# Patient Record
Sex: FEMALE | Race: BLACK OR AFRICAN AMERICAN | NOT HISPANIC OR LATINO | Employment: FULL TIME | URBAN - METROPOLITAN AREA
[De-identification: names, ages, dates, MRNs, and addresses within clinical notes are randomized per-mention and may not be internally consistent; named-entity substitution may affect disease eponyms.]

---

## 2021-12-03 ENCOUNTER — OFFICE VISIT (OUTPATIENT)
Dept: FAMILY MEDICINE CLINIC | Facility: CLINIC | Age: 36
End: 2021-12-03

## 2021-12-03 ENCOUNTER — OFFICE VISIT (OUTPATIENT)
Dept: OBSTETRICS AND GYNECOLOGY | Facility: CLINIC | Age: 36
End: 2021-12-03

## 2021-12-03 ENCOUNTER — PATIENT ROUNDING (BHMG ONLY) (OUTPATIENT)
Dept: FAMILY MEDICINE CLINIC | Facility: CLINIC | Age: 36
End: 2021-12-03

## 2021-12-03 VITALS
DIASTOLIC BLOOD PRESSURE: 102 MMHG | OXYGEN SATURATION: 99 % | WEIGHT: 293 LBS | HEART RATE: 80 BPM | BODY MASS INDEX: 45.99 KG/M2 | SYSTOLIC BLOOD PRESSURE: 146 MMHG | HEIGHT: 67 IN | TEMPERATURE: 98.9 F

## 2021-12-03 VITALS
HEIGHT: 67 IN | DIASTOLIC BLOOD PRESSURE: 83 MMHG | SYSTOLIC BLOOD PRESSURE: 158 MMHG | WEIGHT: 293 LBS | HEART RATE: 76 BPM | BODY MASS INDEX: 45.99 KG/M2

## 2021-12-03 DIAGNOSIS — E55.9 VITAMIN D DEFICIENCY: ICD-10-CM

## 2021-12-03 DIAGNOSIS — R10.2 PELVIC PAIN IN FEMALE: ICD-10-CM

## 2021-12-03 DIAGNOSIS — E78.2 MIXED HYPERLIPIDEMIA: ICD-10-CM

## 2021-12-03 DIAGNOSIS — N92.6 MISSED MENSES: Primary | ICD-10-CM

## 2021-12-03 DIAGNOSIS — L70.9 ACNE, UNSPECIFIED ACNE TYPE: ICD-10-CM

## 2021-12-03 DIAGNOSIS — J45.20 MILD INTERMITTENT ASTHMA WITHOUT COMPLICATION: ICD-10-CM

## 2021-12-03 DIAGNOSIS — I10 PRIMARY HYPERTENSION: Primary | ICD-10-CM

## 2021-12-03 DIAGNOSIS — L70.0 ACNE VULGARIS: ICD-10-CM

## 2021-12-03 DIAGNOSIS — R63.5 WEIGHT GAIN: ICD-10-CM

## 2021-12-03 LAB
B-HCG UR QL: NEGATIVE
EXPIRATION DATE: NORMAL
INTERNAL NEGATIVE CONTROL: NEGATIVE
INTERNAL POSITIVE CONTROL: POSITIVE
Lab: NORMAL

## 2021-12-03 PROCEDURE — 81025 URINE PREGNANCY TEST: CPT | Performed by: OBSTETRICS & GYNECOLOGY

## 2021-12-03 PROCEDURE — 99204 OFFICE O/P NEW MOD 45 MIN: CPT | Performed by: OBSTETRICS & GYNECOLOGY

## 2021-12-03 PROCEDURE — 99204 OFFICE O/P NEW MOD 45 MIN: CPT | Performed by: NURSE PRACTITIONER

## 2021-12-03 RX ORDER — LOSARTAN POTASSIUM 50 MG/1
50 TABLET ORAL DAILY
COMMUNITY
Start: 2021-11-06 | End: 2022-06-24 | Stop reason: SDUPTHER

## 2021-12-03 RX ORDER — ALBUTEROL SULFATE 90 UG/1
2 AEROSOL, METERED RESPIRATORY (INHALATION) EVERY 4 HOURS PRN
COMMUNITY
End: 2022-07-06 | Stop reason: SDUPTHER

## 2021-12-03 RX ORDER — DOXYCYCLINE HYCLATE 100 MG
100 TABLET ORAL AS NEEDED
COMMUNITY
Start: 2021-11-12 | End: 2022-11-18

## 2021-12-03 RX ORDER — ATORVASTATIN CALCIUM 40 MG/1
40 TABLET, FILM COATED ORAL DAILY
COMMUNITY
End: 2022-11-03 | Stop reason: SDUPTHER

## 2021-12-03 RX ORDER — MINOCYCLINE 40 MG/G
AEROSOL, FOAM TOPICAL
COMMUNITY
Start: 2021-11-29 | End: 2022-11-18

## 2021-12-03 NOTE — PROGRESS NOTES
"Chief Complaint  Establish Care and Hypertension    Subjective          Leonarda Hogan presents to [facility]  History of Present Illness  She presents today as a new patient to establish care.  [place]    Hypertension: Blood pressure is noted to be elevated today at 146/102.  Patient states she has not been good about taking her medications lately.  She was previously on amlodipine 10 mg and hydrochlorothiazide 25 mg daily but was switched to losartan 50 mg daily.    Hyperlipidemia: She is currently on atorvastatin 40 mg every evening.  She has not had a lab work checked for over 6 months.    She has a history of chronic acne and takes doxycycline and uses Amzeeq topical.    She does have family history of diabetes and heart disease.  She states she has been tested for diabetes in the past and has been negative.    Her sister was diagnosed with breast cancer at age 39.  Patient has never had a mammogram.  She states that she discussed this with her previous provider and does not want to get a mammogram because she does not want to get radiation exposure at this time.    She states she has a history of vitamin D deficiency and would like to have her vitamin D level checked with her labs.    She states she has a history of asthma and only uses her albuterol inhaler occasionally.  Objective   Vital Signs:   BP (!) 146/102   Pulse 80   Temp 98.9 °F (37.2 °C)   Ht 170.2 cm (67\")   Wt (!) 153 kg (336 lb 6.4 oz)   SpO2 99%   BMI 52.69 kg/m²     Physical Exam  Vitals reviewed.   Constitutional:       Appearance: Normal appearance. She is well-developed.   Neck:      Thyroid: No thyroid mass, thyromegaly or thyroid tenderness.   Cardiovascular:      Rate and Rhythm: Normal rate and regular rhythm.      Heart sounds: No murmur heard.  No friction rub. No gallop.    Pulmonary:      Effort: Pulmonary effort is normal.      Breath sounds: Normal breath " sounds. No wheezing or rhonchi.   Lymphadenopathy:      Cervical: No cervical adenopathy.   Skin:     General: Skin is warm and dry.   Neurological:      Mental Status: She is alert and oriented to person, place, and time.      Cranial Nerves: No cranial nerve deficit.   Psychiatric:         Mood and Affect: Mood and affect normal.         Behavior: Behavior normal.         Thought Content: Thought content normal. Thought content does not include homicidal or suicidal ideation.         Judgment: Judgment normal.        Result Review :                 Assessment and Plan    Diagnoses and all orders for this visit:    1. Primary hypertension (Primary)  Assessment & Plan:  Hypertension is Uncontrolled due to patient not taking medication every day.  Continue current medications.  Ambulatory blood pressure monitoring.  Advised to take medication every day and to monitor blood pressure at home.  Patient to notify if blood pressure does not improve.  Blood pressure will be reassessed at the next regular appointment.    Orders:  -     CBC Auto Differential  -     Comprehensive Metabolic Panel  -     Lipid Panel  -     TSH Rfx On Abnormal To Free T4    2. Mixed hyperlipidemia  Assessment & Plan:  Lipid abnormalities are newly identified.  Pharmacotherapy as ordered.  Lipids will be reassessed Today with labs.    Orders:  -     Comprehensive Metabolic Panel  -     Lipid Panel  -     TSH Rfx On Abnormal To Free T4    3. Mild intermittent asthma without complication  Assessment & Plan:  Asthma is unchanged.  The patient is experiencing no daytime asthma symptoms. She is experiencing no nighttime asthma symptoms.  Discussed monitoring symptoms and use of quick-relief medications and contacting us early in the course of exacerbations.          4. Acne vulgaris  Assessment & Plan:  Stable on meds as listed.      5. Vitamin D deficiency  -     Vitamin D 25 Hydroxy      Follow Up   Return in about 6 months (around 6/3/2022) for  Next scheduled follow up.  Patient was given instructions and counseling regarding her condition or for health maintenance advice. Please see specific information pulled into the AVS if appropriate.   As staff was getting patient to get her labs, she mentioned that she forgot to discuss her stomach pain and gas issues.  Medical staff explained that they will get her labs and put her back in the room for me to come back in and talk to her but stated that it might be a few minutes.  Patient got upset stating that the provider should come right back and to see her.  She decided to leave without getting her labs and stated she was going to find another provider.

## 2021-12-03 NOTE — ASSESSMENT & PLAN NOTE
Hypertension is Uncontrolled due to patient not taking medication every day.  Continue current medications.  Ambulatory blood pressure monitoring.  Advised to take medication every day and to monitor blood pressure at home.  Patient to notify if blood pressure does not improve.  Blood pressure will be reassessed at the next regular appointment.

## 2021-12-03 NOTE — PROGRESS NOTES
"GYN Problem/Follow Up Visit    Chief Complaint   Patient presents with   • Menstrual Problem   • Pelvic Pain           HPI  Leonarda Hogan is a 36 y.o. female, No obstetric history on file., who presents for no menses since october. States typically has q mon menses but skipped November. Has recently moved and didn't know if the stress caused her to skip her period. Also c/o weight gain and acne. Sees derm. Also c/o pelvic pain and bloating/gassy feeling.        Additional OB/GYN History   Patient's last menstrual period was 10/11/2021 (exact date).  Current contraception: contraceptive methods: None  Desires to: do not start contraception  Allergies : Patient has no known allergies.     The additional following portions of the patient's history were reviewed and updated as appropriate: allergies, current medications, past family history, past medical history, past social history, past surgical history and problem list.    Review of Systems    I have reviewed and agree with the HPI, ROS, and historical information as entered above. Elizabeth Lara, APRN    Objective   /83   Pulse 76   Ht 170.2 cm (67\")   Wt (!) 152 kg (335 lb)   LMP 10/11/2021 (Exact Date)   Breastfeeding No   BMI 52.47 kg/m²     Physical Exam  Vitals reviewed.   Abdominal:      Palpations: Abdomen is soft.      Tenderness: There is no abdominal tenderness.      Comments: No pelvic tenderness noted.   Neurological:      Mental Status: She is alert and oriented to person, place, and time.            Assessment and Plan    Diagnoses and all orders for this visit:    1. Missed menses (Primary)  -     US Pelvis Transvaginal Non OB; Future  -     17-Hydroxyprogesterone; Future  -     DHEA-Sulfate; Future  -     Testosterone, Bioavailable (M); Future  -     Glucose, Fasting; Future  -     Insulin, Total; Future  -     Prolactin; Future  -     T4, Free; Future  -     TSH; Future  -     POC Pregnancy, Urine    2. Weight gain  -     US Pelvis " Transvaginal Non OB; Future  -     17-Hydroxyprogesterone; Future  -     DHEA-Sulfate; Future  -     Testosterone, Bioavailable (M); Future  -     Glucose, Fasting; Future  -     Insulin, Total; Future  -     Prolactin; Future  -     T4, Free; Future  -     TSH; Future    3. Acne, unspecified acne type  -     US Pelvis Transvaginal Non OB; Future  -     17-Hydroxyprogesterone; Future  -     DHEA-Sulfate; Future  -     Testosterone, Bioavailable (M); Future  -     Glucose, Fasting; Future  -     Insulin, Total; Future  -     Prolactin; Future  -     T4, Free; Future  -     TSH; Future    4. Pelvic pain in female  -     US Pelvis Transvaginal Non OB; Future    will check fasting pcos panel and pelvic u/s. Discussed provera. Will do round of provera if no menses in December. Also needs wwe.    Counseling:  She understands the importance of having the above orders performed in a timely fashion.  She is encouraged to review her results online and/or contact or office if she has questions.     Follow Up:  Return for lab and u/s f/u.      Elizabeth Lara, PAM  12/03/2021

## 2021-12-03 NOTE — ASSESSMENT & PLAN NOTE
Lipid abnormalities are newly identified.  Pharmacotherapy as ordered.  Lipids will be reassessed Today with labs.

## 2021-12-03 NOTE — PROGRESS NOTES
December 3, 2021    Hello, may I speak with Leonarda Hogan?    My name is Ishmael Estevez     I am  with [facility]  [address]  [place] [address]  [phone].    Before we get started may I verify your date of birth? 1985    I am calling to officially welcome you to our practice and ask about your recent visit. Is this a good time to talk? yes    Tell me about your visit with us. What things went well?  Pt was unhappy with all of the visit. Pt stated that she was not called back at a timely manner and that once she had gotten screen she should have been seen by the provider. Also, provider had not addressed all of her concern or address why she came to the visit. In addition, she did not like that cyndy was trying to encourage her to have a mammogram she felt she did not need due to her age. Pt left with out drawing labs was displease because she had not been fasting and provider wanted to take the labs.        We're always looking for ways to make our patients' experiences even better. Do you have recommendations on ways we may improve?  no    Overall were you satisfied with your first visit to our practice? no       I appreciate you taking the time to speak with me today. Is there anything else I can do for you? Yes,     practice manage need to find out if she can change providers       Thank you, and have a great day.

## 2021-12-06 ENCOUNTER — PATIENT ROUNDING (BHMG ONLY) (OUTPATIENT)
Dept: FAMILY MEDICINE CLINIC | Facility: CLINIC | Age: 36
End: 2021-12-06

## 2021-12-09 ENCOUNTER — TELEPHONE (OUTPATIENT)
Dept: OBSTETRICS AND GYNECOLOGY | Facility: CLINIC | Age: 36
End: 2021-12-09

## 2021-12-09 NOTE — TELEPHONE ENCOUNTER
TRIED TO CALL PT TO LET HER KNOW SHE HAS AN APPT ON 1/6/21 WITH SAMMIE JEAN AT [facility].   WAS FULL AND NOT ABLE TO LEAVE A .  IF SHE CALLS PLEASE LET HER KNOW ABOUT HER THE F/U APPT WITH HER  AT [place]

## 2021-12-17 ENCOUNTER — LAB (OUTPATIENT)
Dept: OBSTETRICS AND GYNECOLOGY | Facility: CLINIC | Age: 36
End: 2021-12-17

## 2021-12-17 ENCOUNTER — OFFICE VISIT (OUTPATIENT)
Dept: INTERNAL MEDICINE | Facility: CLINIC | Age: 36
End: 2021-12-17

## 2021-12-17 VITALS
DIASTOLIC BLOOD PRESSURE: 86 MMHG | WEIGHT: 293 LBS | OXYGEN SATURATION: 100 % | TEMPERATURE: 99.2 F | SYSTOLIC BLOOD PRESSURE: 126 MMHG | BODY MASS INDEX: 45.99 KG/M2 | HEIGHT: 67 IN | HEART RATE: 78 BPM

## 2021-12-17 DIAGNOSIS — I10 ESSENTIAL HYPERTENSION: ICD-10-CM

## 2021-12-17 DIAGNOSIS — E55.9 VITAMIN D DEFICIENCY: Primary | ICD-10-CM

## 2021-12-17 DIAGNOSIS — Z80.3 FAMILY HISTORY OF BREAST CANCER IN SISTER: ICD-10-CM

## 2021-12-17 DIAGNOSIS — N92.6 MISSED MENSES: ICD-10-CM

## 2021-12-17 DIAGNOSIS — E78.5 HYPERLIPIDEMIA, UNSPECIFIED HYPERLIPIDEMIA TYPE: ICD-10-CM

## 2021-12-17 DIAGNOSIS — L70.9 ACNE, UNSPECIFIED ACNE TYPE: ICD-10-CM

## 2021-12-17 DIAGNOSIS — R63.5 WEIGHT GAIN: ICD-10-CM

## 2021-12-17 LAB
25(OH)D3 SERPL-MCNC: 26.8 NG/ML (ref 30–100)
ALBUMIN SERPL-MCNC: 4.5 G/DL (ref 3.5–5.2)
ALBUMIN/GLOB SERPL: 1.7 G/DL
ALP SERPL-CCNC: 71 U/L (ref 39–117)
ALT SERPL W P-5'-P-CCNC: 17 U/L (ref 1–33)
ANION GAP SERPL CALCULATED.3IONS-SCNC: 11.5 MMOL/L (ref 5–15)
AST SERPL-CCNC: 19 U/L (ref 1–32)
BILIRUB SERPL-MCNC: 1.1 MG/DL (ref 0–1.2)
BUN SERPL-MCNC: 9 MG/DL (ref 6–20)
BUN/CREAT SERPL: 10.6 (ref 7–25)
CALCIUM SPEC-SCNC: 9.8 MG/DL (ref 8.6–10.5)
CHLORIDE SERPL-SCNC: 103 MMOL/L (ref 98–107)
CHOLEST SERPL-MCNC: 147 MG/DL (ref 0–200)
CO2 SERPL-SCNC: 26.5 MMOL/L (ref 22–29)
CREAT SERPL-MCNC: 0.85 MG/DL (ref 0.57–1)
GFR SERPL CREATININE-BSD FRML MDRD: 92 ML/MIN/1.73
GLOBULIN UR ELPH-MCNC: 2.6 GM/DL
GLUCOSE P FAST SERPL-MCNC: 89 MG/DL (ref 74–106)
GLUCOSE SERPL-MCNC: 89 MG/DL (ref 65–99)
HDLC SERPL-MCNC: 66 MG/DL (ref 40–60)
LDLC SERPL CALC-MCNC: 68 MG/DL (ref 0–100)
LDLC/HDLC SERPL: 1.04 {RATIO}
POTASSIUM SERPL-SCNC: 4.7 MMOL/L (ref 3.5–5.2)
PROLACTIN SERPL-MCNC: 15.8 NG/ML (ref 4.79–23.3)
PROT SERPL-MCNC: 7.1 G/DL (ref 6–8.5)
SODIUM SERPL-SCNC: 141 MMOL/L (ref 136–145)
T4 FREE SERPL-MCNC: 1.35 NG/DL (ref 0.93–1.7)
TRIGL SERPL-MCNC: 61 MG/DL (ref 0–150)
TSH SERPL DL<=0.05 MIU/L-ACNC: 0.86 UIU/ML (ref 0.27–4.2)
VLDLC SERPL-MCNC: 13 MG/DL (ref 5–40)

## 2021-12-17 PROCEDURE — 82947 ASSAY GLUCOSE BLOOD QUANT: CPT | Performed by: OBSTETRICS & GYNECOLOGY

## 2021-12-17 PROCEDURE — 82627 DEHYDROEPIANDROSTERONE: CPT | Performed by: OBSTETRICS & GYNECOLOGY

## 2021-12-17 PROCEDURE — 82306 VITAMIN D 25 HYDROXY: CPT | Performed by: OBSTETRICS & GYNECOLOGY

## 2021-12-17 PROCEDURE — 80053 COMPREHEN METABOLIC PANEL: CPT | Performed by: OBSTETRICS & GYNECOLOGY

## 2021-12-17 PROCEDURE — 83498 ASY HYDROXYPROGESTERONE 17-D: CPT | Performed by: OBSTETRICS & GYNECOLOGY

## 2021-12-17 PROCEDURE — 84439 ASSAY OF FREE THYROXINE: CPT | Performed by: OBSTETRICS & GYNECOLOGY

## 2021-12-17 PROCEDURE — 84443 ASSAY THYROID STIM HORMONE: CPT | Performed by: OBSTETRICS & GYNECOLOGY

## 2021-12-17 PROCEDURE — 83525 ASSAY OF INSULIN: CPT | Performed by: OBSTETRICS & GYNECOLOGY

## 2021-12-17 PROCEDURE — 99214 OFFICE O/P EST MOD 30 MIN: CPT | Performed by: NURSE PRACTITIONER

## 2021-12-17 PROCEDURE — 84410 TESTOSTERONE BIOAVAILABLE: CPT | Performed by: OBSTETRICS & GYNECOLOGY

## 2021-12-17 PROCEDURE — 80061 LIPID PANEL: CPT | Performed by: OBSTETRICS & GYNECOLOGY

## 2021-12-17 PROCEDURE — 84146 ASSAY OF PROLACTIN: CPT | Performed by: OBSTETRICS & GYNECOLOGY

## 2021-12-17 NOTE — ASSESSMENT & PLAN NOTE
Continue to follow with dermatology as scheduled for management of doxycycline and Amzeeq. Will continue to monitor.

## 2021-12-17 NOTE — ASSESSMENT & PLAN NOTE
Discussed with patient increased risk due to family history and need to proceed with early screening with mammogram. Patient aware of risk, defers at this time but will consider at a later date.

## 2021-12-17 NOTE — PROGRESS NOTES
"Chief Complaint  Establish Care and Hypertension    Subjective          Leonarda Hogan presents to [facility]  Previous PCP: Dr. Ernandez; Patient recently moved from [place] for a job at [employer]  Last labs: Today  LMP: 12/5/2021  PAP: 2019  Mammogram: Family history of breast cancer in sister, diagnosed at age 39  Colonoscopy: Declines family history of colon cancer  Influenza vaccination: Declines  COVID19 vaccination: Up to date   Eye exam: 3/2021  Dental exam: 9/2021  Smoking history: Denies  Specialists: OB/GYN; Dermatology    Annual physical exam-  Patient reports father with a heart attack at age 51. Denies chest pain, blurry vision, headache, leg swelling, shortness of breath, seizure activity. Patient has no concerns at this time.    HTN-  Managed with losartan. Denies chest pain, blurry vision, headache, leg swelling.    Vitamin D-  Previously managed with oral supplement, not currently taking.    Acne-  Managed with doxycycline and Amzeeq through Dermatologist in [place].     HLD-  Managed with statin. Well tolerated, denies leg cramping.      Objective   Vital Signs:   /86   Pulse 78   Temp 99.2 °F (37.3 °C)   Ht 170.2 cm (67\")   Wt (!) 149 kg (329 lb)   SpO2 100%   BMI 51.53 kg/m²     Physical Exam  Constitutional:       Appearance: Normal appearance.   HENT:      Head: Normocephalic and atraumatic.      Right Ear: Tympanic membrane normal.      Left Ear: Tympanic membrane normal.      Nose: Nose normal.      Mouth/Throat:      Mouth: Mucous membranes are moist.      Pharynx: Oropharynx is clear.   Eyes:      Extraocular Movements: Extraocular movements intact.      Conjunctiva/sclera: Conjunctivae normal.      Pupils: Pupils are equal, round, and reactive to light.   Neck:      Thyroid: No thyroid mass, thyromegaly or thyroid tenderness.   Cardiovascular:      Rate and Rhythm: Normal rate and regular rhythm.      Heart sounds: Normal " heart sounds.   Pulmonary:      Effort: Pulmonary effort is normal.      Breath sounds: Normal breath sounds.   Abdominal:      General: Bowel sounds are normal.      Palpations: Abdomen is soft.   Skin:     General: Skin is warm and dry.   Neurological:      General: No focal deficit present.      Mental Status: She is alert and oriented to person, place, and time.   Psychiatric:         Mood and Affect: Mood normal.         Behavior: Behavior normal.         Thought Content: Thought content normal.        Result Review :                 Assessment and Plan    Diagnoses and all orders for this visit:    1. Vitamin D deficiency (Primary)  Assessment & Plan:  Vitamin D level with labs.    Orders:  -     Vitamin D 25 hydroxy    2. Essential hypertension  Assessment & Plan:  Well controlled, continue losartan. Encouraged patient to continue to monitor blood pressure at home and to call or return to clinic with consistent elevations greater than 130/80. CMP added to blood in lab drawn for OB/GYN this morning. Unable to add CBC, will check at a later date.      Orders:  -     Comprehensive Metabolic Panel    3. Hyperlipidemia, unspecified hyperlipidemia type  Assessment & Plan:  Continue statin. Lipid panel with labs, will adjust medication if warranted.    Orders:  -     Lipid Panel    4. Family history of breast cancer in sister  Assessment & Plan:  Discussed with patient increased risk due to family history and need to proceed with early screening with mammogram. Patient aware of risk, defers at this time but will consider at a later date.        Follow Up   Return in about 6 months (around 6/17/2022).  Patient was given instructions and counseling regarding her condition or for health maintenance advice. Please see specific information pulled into the AVS if appropriate.

## 2021-12-17 NOTE — ASSESSMENT & PLAN NOTE
Well controlled, continue losartan. Encouraged patient to continue to monitor blood pressure at home and to call or return to clinic with consistent elevations greater than 130/80. CMP added to blood in lab drawn for OB/GYN this morning. Unable to add CBC, will check at a later date.

## 2021-12-17 NOTE — PATIENT INSTRUCTIONS
Preventive Care 21-39 Years Old, Female  Preventive care refers to lifestyle choices and visits with your health care provider that can promote health and wellness. This includes:  · A yearly physical exam. This is also called an annual wellness visit.  · Regular dental and eye exams.  · Immunizations.  · Screening for certain conditions.  · Healthy lifestyle choices, such as:  ? Eating a healthy diet.  ? Getting regular exercise.  ? Not using drugs or products that contain nicotine and tobacco.  ? Limiting alcohol use.  What can I expect for my preventive care visit?  Physical exam  Your health care provider may check your:  · Height and weight. These may be used to calculate your BMI (body mass index). BMI is a measurement that tells if you are at a healthy weight.  · Heart rate and blood pressure.  · Body temperature.  · Skin for abnormal spots.  Counseling  Your health care provider may ask you questions about your:  · Past medical problems.  · Family's medical history.  · Alcohol, tobacco, and drug use.  · Emotional well-being.  · Home life and relationship well-being.  · Sexual activity.  · Diet, exercise, and sleep habits.  · Work and work environment.  · Access to firearms.  · Method of birth control.  · Menstrual cycle.  · Pregnancy history.  What immunizations do I need?    Vaccines are usually given at various ages, according to a schedule. Your health care provider will recommend vaccines for you based on your age, medical history, and lifestyle or other factors, such as travel or where you work.  What tests do I need?    Blood tests  · Lipid and cholesterol levels. These may be checked every 5 years starting at age 20.  · Hepatitis C test.  · Hepatitis B test.  Screening  · Diabetes screening. This is done by checking your blood sugar (glucose) after you have not eaten for a while (fasting).  · STD (sexually transmitted disease) testing, if you are at risk.  · BRCA-related cancer screening. This may be  done if you have a family history of breast, ovarian, tubal, or peritoneal cancers.  · Pelvic exam and Pap test. This may be done every 3 years starting at age 21. Starting at age 30, this may be done every 5 years if you have a Pap test in combination with an HPV test.  Talk with your health care provider about your test results, treatment options, and if necessary, the need for more tests.  Follow these instructions at home:  Eating and drinking    · Eat a healthy diet that includes fresh fruits and vegetables, whole grains, lean protein, and low-fat dairy products.  · Take vitamin and mineral supplements as recommended by your health care provider.  · Do not drink alcohol if:  ? Your health care provider tells you not to drink.  ? You are pregnant, may be pregnant, or are planning to become pregnant.  · If you drink alcohol:  ? Limit how much you have to 0-1 drink a day.  ? Be aware of how much alcohol is in your drink. In the [place] one drink equals one 12 oz bottle of beer (355 mL), one 5 oz glass of wine (148 mL), or one 1½ oz glass of hard liquor (44 mL).    Lifestyle  · Take daily care of your teeth and gums. Brush your teeth every morning and night with fluoride toothpaste. Floss one time each day.  · Stay active. Exercise for at least 30 minutes 5 or more days each week.  · Do not use any products that contain nicotine or tobacco, such as cigarettes, e-cigarettes, and chewing tobacco. If you need help quitting, ask your health care provider.  · Do not use drugs.  · If you are sexually active, practice safe sex. Use a condom or other form of birth control (contraception) in order to prevent pregnancy and STIs (sexually transmitted infections). If you plan to become pregnant, see your health care provider for a pre-conception visit.  · Find healthy ways to cope with stress, such as:  ? Meditation, yoga, or listening to music.  ? Journaling.  ? Talking to a trusted person.  ? Spending time with friends and  family.  Safety  · Always wear your seat belt while driving or riding in a vehicle.  · Do not drive:  ? If you have been drinking alcohol. Do not ride with someone who has been drinking.  ? When you are tired or distracted.  ? While texting.  · Wear a helmet and other protective equipment during sports activities.  · If you have firearms in your house, make sure you follow all gun safety procedures.  · Seek help if you have been physically or sexually abused.  What's next?  · Go to your health care provider once a year for an annual wellness visit.  · Ask your health care provider how often you should have your eyes and teeth checked.  · Stay up to date on all vaccines.  This information is not intended to replace advice given to you by your health care provider. Make sure you discuss any questions you have with your health care provider.  Document Revised: 09/02/2020 Document Reviewed: 08/29/2019  [employer] Patient Education © 2021 [employer]

## 2021-12-18 LAB
DHEA-S SERPL-MCNC: 131 UG/DL (ref 57.3–279.2)
INSULIN SERPL-ACNC: 25.3 UIU/ML (ref 2.6–24.9)

## 2021-12-20 DIAGNOSIS — E55.9 VITAMIN D DEFICIENCY: Primary | ICD-10-CM

## 2021-12-20 RX ORDER — ERGOCALCIFEROL 1.25 MG/1
50000 CAPSULE ORAL WEEKLY
Qty: 12 CAPSULE | Refills: 0 | Status: SHIPPED | OUTPATIENT
Start: 2021-12-20 | End: 2022-03-08

## 2021-12-22 ENCOUNTER — PATIENT ROUNDING (BHMG ONLY) (OUTPATIENT)
Dept: INTERNAL MEDICINE | Facility: CLINIC | Age: 36
End: 2021-12-22

## 2021-12-22 NOTE — PROGRESS NOTES
December 22, 2021    Hello, may I speak with Leonarda Hogan?    My name is Meghann      I am  with [facility]  [address] [id number] [phone].    Before we get started may I verify your date of birth? 1985    I am calling to officially welcome you to our practice and ask about your recent visit. Is this a good time to talk? yes    Tell me about your visit with us. What things went well?  everything went well        We're always looking for ways to make our patients' experiences even better. Do you have recommendations on ways we may improve?  no    Overall were you satisfied with your first visit to our practice? yes       I appreciate you taking the time to speak with me today. Is there anything else I can do for you? no      Thank you, and have a great day.

## 2021-12-23 LAB
17OHP SERPL-MCNC: 24 NG/DL
TESTOST SERPL-MCNC: 38 NG/DL
TESTOSTERONE.FREE+WB MFR SERPL: 14.3 %
TESTOSTERONE.FREE+WB SERPL-MCNC: 5.4 NG/DL

## 2022-01-05 ENCOUNTER — APPOINTMENT (OUTPATIENT)
Dept: ULTRASOUND IMAGING | Facility: HOSPITAL | Age: 37
End: 2022-01-05

## 2022-01-31 ENCOUNTER — HOSPITAL ENCOUNTER (OUTPATIENT)
Dept: ULTRASOUND IMAGING | Facility: HOSPITAL | Age: 37
Discharge: HOME | End: 2022-01-31
Admitting: OBSTETRICS & GYNECOLOGY

## 2022-01-31 DIAGNOSIS — N92.6 MISSED MENSES: ICD-10-CM

## 2022-01-31 DIAGNOSIS — R63.5 WEIGHT GAIN: ICD-10-CM

## 2022-01-31 DIAGNOSIS — L70.9 ACNE, UNSPECIFIED ACNE TYPE: ICD-10-CM

## 2022-01-31 DIAGNOSIS — R10.2 PELVIC PAIN IN FEMALE: ICD-10-CM

## 2022-01-31 PROCEDURE — 76830 TRANSVAGINAL US NON-OB: CPT

## 2022-01-31 PROCEDURE — 76856 US EXAM PELVIC COMPLETE: CPT

## 2022-02-02 ENCOUNTER — OFFICE VISIT (OUTPATIENT)
Dept: OBSTETRICS AND GYNECOLOGY | Facility: CLINIC | Age: 37
End: 2022-02-02

## 2022-02-02 DIAGNOSIS — D21.9 FIBROIDS: Primary | ICD-10-CM

## 2022-02-02 PROCEDURE — 99442 PR PHYS/QHP TELEPHONE EVALUATION 11-20 MIN: CPT | Performed by: OBSTETRICS & GYNECOLOGY

## 2022-02-02 NOTE — PROGRESS NOTES
GYN Problem/Follow Up Visit    Chief Complaint   Patient presents with   • Follow-up   You have chosen to receive care through a telephone visit. Do you consent to use a telephone visit for your medical care today? Yes        HPI  Leonarda Hogan is a 36 y.o. female, No obstetric history on file., who presents for lab and u/s f/u. Seen in office 12/3/21 for skipping November menses, weight gain, and bloating. States had a normal December and January menses.       Additional OB/GYN History   No LMP recorded.  Allergies : Patient has no known allergies.     The additional following portions of the patient's history were reviewed and updated as appropriate: allergies, current medications, past family history, past medical history, past social history, past surgical history and problem list.    Review of Systems    I have reviewed and agree with the HPI, ROS, and historical information as entered above. PAM De La O    Objective   There were no vitals taken for this visit.    Physical Exam  Neurological:      Mental Status: She is alert and oriented to person, place, and time.            Assessment and Plan    Diagnoses and all orders for this visit:    1. Fibroids (Primary)    reviewed pcos panel done 12/17/21: normal except insulin slightly elevated. Discussed possible insulin resistance and increased risk for diabetes. Reviewed pelvic u/s done 1/31/22: multiple uterine fibroids, largest 6.5 cm. Stripe 10 mm. Normal ovaries. Discussed tx options including monitoring sx and f/u prn, hormonal bc to help with sx, or seeing gyn doc for eval. Pt desires to monitor sx for now and consider options. Will call back when decides.    Counseling:  She understands the importance of having the above orders performed in a timely fashion.  She is encouraged to review her results online and/or contact or office if she has questions.     Follow Up:  Return if symptoms worsen or fail to improve.    This visit has been rescheduled  as a phone visit to comply with patient safety concerns in accordance with CDC recommendations. Total time of discussion was 15 minutes.      PAM De La O  02/02/2022

## 2022-03-31 ENCOUNTER — OFFICE VISIT (OUTPATIENT)
Dept: OBSTETRICS AND GYNECOLOGY | Facility: CLINIC | Age: 37
End: 2022-03-31

## 2022-03-31 VITALS
SYSTOLIC BLOOD PRESSURE: 139 MMHG | WEIGHT: 293 LBS | HEIGHT: 67 IN | BODY MASS INDEX: 45.99 KG/M2 | DIASTOLIC BLOOD PRESSURE: 83 MMHG | HEART RATE: 76 BPM

## 2022-03-31 DIAGNOSIS — D25.9 UTERINE LEIOMYOMA, UNSPECIFIED LOCATION: Primary | ICD-10-CM

## 2022-03-31 PROCEDURE — 99213 OFFICE O/P EST LOW 20 MIN: CPT | Performed by: OBSTETRICS & GYNECOLOGY

## 2022-03-31 RX ORDER — TAZAROTENE 0.45 MG/G
LOTION TOPICAL
COMMUNITY
Start: 2022-03-10

## 2022-04-03 NOTE — PROGRESS NOTES
"FU GYN Visit- Fibroids    Chief Complaint   Patient presents with   • FUUS Fibroids       HPI:   38 y/o female G 0 P 0 with LMP 3/30/22 using nothing for birth control presents to discuss uterine fibroids.  Menses q month x 5-7 days with 4-5 days heavy.  She is not preventing pregnancy and is ok if  She becomes pregnant. She had an ultrasound due to skipped menses and was diagnosed  With fibroids and she is referred here to discuss this. Desires future fertility Yes      History: PMHx, Meds, Allergies, PSHx, Social Hx, and POBHx all reviewed and updated.    PHYSICAL EXAM:  /83   Pulse 76   Ht 170.2 cm (67\")   Wt (!) 153 kg (338 lb)   LMP 03/30/2022   Breastfeeding No   BMI 52.94 kg/m²   General- NAD, alert and oriented, appropriate  Psych- Normal mood, good memory    Neck- No masses, no thyroid enlargement  CV- Regular rhythm, no murnurs  Resp- CTA to bases, no wheezes  Ext- No edema, no cyanosis    Skin- No lesions, no rashes, no acanthosis nigricans    PROCEDURE:  US PELVIC AND TRANSVAGINAL NONOBSTETRICAL     COMPARISON: None     INDICATIONS:  oligomenorrhea, pelvic pain     TECHNIQUE:    Ultrasound examination of the pelvis was performed, using endovaginal technique.       FINDINGS:          The uterus is anteverted and measures 8 cm in length.  Fibroid partially exophytic from the   anterior uterus measures 2.9 cm and has a sub serosal component.  A predominately intramural   fibroid in the uterus measures 4.1 cm.  Another fibroid, predominately intramural measures 2.4 cm.    Endometrium measures 10 mm in thickness.  An exophytic fibroid from the posterior uterus measures   up to 6.5 cm.  Normal appearance of the right ovary and left ovary.       IMPRESSION:               Multiple fibroids in the uterus as above            LARA SILVA MD         Electronically Signed and Approved By: LARA SILVA MD on 2/01/2022 at 9:03     ASSESSMENT AND PLAN:  Diagnoses and all orders for this visit:    1. Uterine " leiomyoma, unspecified location (Primary)      • I discussed with patient fibroids and association with menorrhagia, infertility and less than 1% chance of sarcoma.  Pt is undecided in future fertility.  Pt informed of advanced maternal age and risks associated with pregnancy that are increasing with age.  Pt will decide if she desires referral to mfm.  She is reassured with respect to fibroids.  •     Follow Up:  No follow-ups on file.          Tianna Graham,   03/31/2022    [facility]  [address] [place] [address]  Dept: [phone]  Dept Fax: [phone]  Loc: [phone]  Loc Fax: [phone]

## 2022-06-24 ENCOUNTER — OFFICE VISIT (OUTPATIENT)
Dept: INTERNAL MEDICINE | Facility: CLINIC | Age: 37
End: 2022-06-24

## 2022-06-24 VITALS
WEIGHT: 293 LBS | HEART RATE: 74 BPM | SYSTOLIC BLOOD PRESSURE: 146 MMHG | DIASTOLIC BLOOD PRESSURE: 82 MMHG | TEMPERATURE: 97.6 F | HEIGHT: 67 IN | OXYGEN SATURATION: 100 % | BODY MASS INDEX: 45.99 KG/M2

## 2022-06-24 DIAGNOSIS — I10 ESSENTIAL HYPERTENSION: ICD-10-CM

## 2022-06-24 DIAGNOSIS — L70.9 ACNE, UNSPECIFIED ACNE TYPE: ICD-10-CM

## 2022-06-24 DIAGNOSIS — E78.5 HYPERLIPIDEMIA, UNSPECIFIED HYPERLIPIDEMIA TYPE: ICD-10-CM

## 2022-06-24 DIAGNOSIS — Z80.3 FAMILY HISTORY OF BREAST CANCER IN SISTER: ICD-10-CM

## 2022-06-24 DIAGNOSIS — Z00.00 ANNUAL PHYSICAL EXAM: Primary | ICD-10-CM

## 2022-06-24 DIAGNOSIS — E55.9 VITAMIN D DEFICIENCY: ICD-10-CM

## 2022-06-24 LAB
25(OH)D3 SERPL-MCNC: 38.3 NG/ML (ref 30–100)
ALBUMIN SERPL-MCNC: 4 G/DL (ref 3.5–5.2)
ALBUMIN/GLOB SERPL: 1.1 G/DL
ALP SERPL-CCNC: 67 U/L (ref 39–117)
ALT SERPL W P-5'-P-CCNC: 14 U/L (ref 1–33)
ANION GAP SERPL CALCULATED.3IONS-SCNC: 12.6 MMOL/L (ref 5–15)
AST SERPL-CCNC: 19 U/L (ref 1–32)
BASOPHILS # BLD AUTO: 0.02 10*3/MM3 (ref 0–0.2)
BASOPHILS NFR BLD AUTO: 0.4 % (ref 0–1.5)
BILIRUB SERPL-MCNC: 1 MG/DL (ref 0–1.2)
BUN SERPL-MCNC: 10 MG/DL (ref 6–20)
BUN/CREAT SERPL: 12.2 (ref 7–25)
CALCIUM SPEC-SCNC: 9.2 MG/DL (ref 8.6–10.5)
CHLORIDE SERPL-SCNC: 100 MMOL/L (ref 98–107)
CHOLEST SERPL-MCNC: 151 MG/DL (ref 0–200)
CO2 SERPL-SCNC: 25.4 MMOL/L (ref 22–29)
CREAT SERPL-MCNC: 0.82 MG/DL (ref 0.57–1)
DEPRECATED RDW RBC AUTO: 40.2 FL (ref 37–54)
EGFRCR SERPLBLD CKD-EPI 2021: 94.6 ML/MIN/1.73
EOSINOPHIL # BLD AUTO: 0.13 10*3/MM3 (ref 0–0.4)
EOSINOPHIL NFR BLD AUTO: 2.4 % (ref 0.3–6.2)
ERYTHROCYTE [DISTWIDTH] IN BLOOD BY AUTOMATED COUNT: 13.7 % (ref 12.3–15.4)
GLOBULIN UR ELPH-MCNC: 3.7 GM/DL
GLUCOSE SERPL-MCNC: 81 MG/DL (ref 65–99)
HCT VFR BLD AUTO: 39.3 % (ref 34–46.6)
HDLC SERPL-MCNC: 66 MG/DL (ref 40–60)
HGB BLD-MCNC: 12.7 G/DL (ref 12–15.9)
IMM GRANULOCYTES # BLD AUTO: 0.01 10*3/MM3 (ref 0–0.05)
IMM GRANULOCYTES NFR BLD AUTO: 0.2 % (ref 0–0.5)
LDLC SERPL CALC-MCNC: 72 MG/DL (ref 0–100)
LDLC/HDLC SERPL: 1.09 {RATIO}
LYMPHOCYTES # BLD AUTO: 2.36 10*3/MM3 (ref 0.7–3.1)
LYMPHOCYTES NFR BLD AUTO: 43.4 % (ref 19.6–45.3)
MCH RBC QN AUTO: 26.5 PG (ref 26.6–33)
MCHC RBC AUTO-ENTMCNC: 32.3 G/DL (ref 31.5–35.7)
MCV RBC AUTO: 81.9 FL (ref 79–97)
MONOCYTES # BLD AUTO: 0.33 10*3/MM3 (ref 0.1–0.9)
MONOCYTES NFR BLD AUTO: 6.1 % (ref 5–12)
NEUTROPHILS NFR BLD AUTO: 2.59 10*3/MM3 (ref 1.7–7)
NEUTROPHILS NFR BLD AUTO: 47.5 % (ref 42.7–76)
NRBC BLD AUTO-RTO: 0 /100 WBC (ref 0–0.2)
PLATELET # BLD AUTO: 342 10*3/MM3 (ref 140–450)
PMV BLD AUTO: 10.9 FL (ref 6–12)
POTASSIUM SERPL-SCNC: 4.1 MMOL/L (ref 3.5–5.2)
PROT SERPL-MCNC: 7.7 G/DL (ref 6–8.5)
RBC # BLD AUTO: 4.8 10*6/MM3 (ref 3.77–5.28)
SODIUM SERPL-SCNC: 138 MMOL/L (ref 136–145)
TRIGL SERPL-MCNC: 64 MG/DL (ref 0–150)
TSH SERPL DL<=0.05 MIU/L-ACNC: 0.82 UIU/ML (ref 0.27–4.2)
VLDLC SERPL-MCNC: 13 MG/DL (ref 5–40)
WBC NRBC COR # BLD: 5.44 10*3/MM3 (ref 3.4–10.8)

## 2022-06-24 PROCEDURE — 80061 LIPID PANEL: CPT | Performed by: NURSE PRACTITIONER

## 2022-06-24 PROCEDURE — 80050 GENERAL HEALTH PANEL: CPT | Performed by: NURSE PRACTITIONER

## 2022-06-24 PROCEDURE — 99395 PREV VISIT EST AGE 18-39: CPT | Performed by: NURSE PRACTITIONER

## 2022-06-24 PROCEDURE — 82306 VITAMIN D 25 HYDROXY: CPT | Performed by: NURSE PRACTITIONER

## 2022-06-24 RX ORDER — LOSARTAN POTASSIUM 50 MG/1
50 TABLET ORAL DAILY
Qty: 90 TABLET | Refills: 1 | Status: SHIPPED | OUTPATIENT
Start: 2022-06-24 | End: 2022-11-18

## 2022-06-24 RX ORDER — AMLODIPINE BESYLATE 5 MG/1
5 TABLET ORAL DAILY
Qty: 30 TABLET | Refills: 1 | Status: SHIPPED | OUTPATIENT
Start: 2022-06-24 | End: 2022-11-03

## 2022-06-24 NOTE — ASSESSMENT & PLAN NOTE
Basic labs in clinic today. Encouraged routine dental and eye exams. Discussed age appropriate immunizations, screenings. Age appropriate handout provided.

## 2022-06-24 NOTE — ASSESSMENT & PLAN NOTE
Continue losartan, will add amlodipine.  Discussed potential for lower extremity edema.  Encouraged patient to continue to monitor blood pressure at home and to call or return to clinic with consistent elevations greater than 130/80.  Patient will return to clinic in 1 month with blood pressure log for review, will determine if further medication adjustments are warranted based on results.  Labs in clinic today to include CBC, CMP.

## 2022-06-24 NOTE — ASSESSMENT & PLAN NOTE
Continue to follow dermatology for management of doxycycline and Amzeeq.  Will continue to monitor.

## 2022-06-24 NOTE — PROGRESS NOTES
"Chief Complaint  Hypertension, Hyperlipidemia, and Annual Exam    Subjective        Leonarda Hogan presents to [facility]  LMP: 6/2022  PAP: 2019, EPW  Mammogram: Family history of breast cancer in sister, diagnosed at age 39; Patient defers mammogram for now  Colonoscopy: Declines family history of colon cancer  COVID19 vaccination: Up to date   Eye exam: 2021, due  Dental exam: 4/2022  Smoking history: Denies  Specialists: OB/GYN; Dermatology     Annual physical exam-  Patient reports father with a heart attack at age 51. Denies chest pain, blurry vision, headache, leg swelling, shortness of breath, seizure activity.      HTN-  Managed with losartan, elevation today. Patient states her previous provider changed her blood pressure medication before she switched, she was previously on amlodipine and HCTZ. She does not check her blood pressure at home. Denies chest pain, blurry vision, headache, leg swelling.  Admits that she has been traveling recently and did have some swelling of her feet.  Denies calf pain, erythema, edema, warmth.     Vitamin D-  Patient took high dose supplement x 3 months, is now taking once daily OTC supplement.      Acne-  Managed with doxycycline and Amzeeq through Dermatologist in [place]. Scheduled to follow up in three months.      HLD-  Managed with statin. Well tolerated, denies leg cramping. Most recent LDL 68.      Objective   Vital Signs:  /82 (BP Location: Left arm, Patient Position: Sitting, Cuff Size: Large Adult)   Pulse 74   Temp 97.6 °F (36.4 °C) (Temporal)   Ht 170.2 cm (67\")   Wt (!) 149 kg (328 lb 3.2 oz)   SpO2 100%   BMI 51.40 kg/m²   Estimated body mass index is 51.4 kg/m² as calculated from the following:    Height as of this encounter: 170.2 cm (67\").    Weight as of this encounter: 149 kg (328 lb 3.2 oz).          Physical Exam  Constitutional:       Appearance: Normal appearance.   HENT:      Head: " Normocephalic and atraumatic.      Right Ear: Tympanic membrane normal.      Left Ear: Tympanic membrane normal.      Nose: Nose normal.      Mouth/Throat:      Mouth: Mucous membranes are moist.      Pharynx: Oropharynx is clear.   Eyes:      Extraocular Movements: Extraocular movements intact.      Conjunctiva/sclera: Conjunctivae normal.      Pupils: Pupils are equal, round, and reactive to light.   Neck:      Thyroid: No thyroid mass, thyromegaly or thyroid tenderness.   Cardiovascular:      Rate and Rhythm: Normal rate and regular rhythm.      Heart sounds: Normal heart sounds.   Pulmonary:      Effort: Pulmonary effort is normal.      Breath sounds: Normal breath sounds.   Abdominal:      General: Bowel sounds are normal.      Palpations: Abdomen is soft.   Skin:     General: Skin is warm and dry.   Neurological:      General: No focal deficit present.      Mental Status: She is alert and oriented to person, place, and time.   Psychiatric:         Mood and Affect: Mood normal.         Behavior: Behavior normal.         Thought Content: Thought content normal.        Result Review :                Assessment and Plan   Diagnoses and all orders for this visit:    1. Annual physical exam (Primary)  Assessment & Plan:  Basic labs in clinic today. Encouraged routine dental and eye exams. Discussed age appropriate immunizations, screenings. Age appropriate handout provided.      Orders:  -     Comprehensive Metabolic Panel  -     CBC & Differential  -     TSH    2. Essential hypertension  Assessment & Plan:  Continue losartan, will add amlodipine.  Discussed potential for lower extremity edema.  Encouraged patient to continue to monitor blood pressure at home and to call or return to clinic with consistent elevations greater than 130/80.  Patient will return to clinic in 1 month with blood pressure log for review, will determine if further medication adjustments are warranted based on results.  Labs in clinic today  to include CBC, CMP.      Orders:  -     Comprehensive Metabolic Panel  -     CBC & Differential  -     TSH    3. Hyperlipidemia, unspecified hyperlipidemia type  Assessment & Plan:  Well controlled, continue statin. Most recent LDL 68. Lipid panel with labs.      Orders:  -     Lipid Panel    4. Vitamin D deficiency  Assessment & Plan:  Continue vitamin D supplement, vitamin D level with labs today.       Orders:  -     Vitamin D 25 Hydroxy    5. Family history of breast cancer in sister  Assessment & Plan:  Patient aware of risk associated with postponing screening, defers for now.      6. Acne, unspecified acne type  Assessment & Plan:  Continue to follow dermatology for management of doxycycline and Amzeeq.  Will continue to monitor.      Other orders  -     amLODIPine (NORVASC) 5 MG tablet; Take 1 tablet by mouth Daily.  Dispense: 30 tablet; Refill: 1  -     losartan (COZAAR) 50 MG tablet; Take 1 tablet by mouth Daily.  Dispense: 90 tablet; Refill: 1           Follow Up   Return in about 6 months (around 12/24/2022).  Patient was given instructions and counseling regarding her condition or for health maintenance advice. Please see specific information pulled into the AVS if appropriate.

## 2022-07-06 ENCOUNTER — OFFICE VISIT (OUTPATIENT)
Dept: INTERNAL MEDICINE | Facility: CLINIC | Age: 37
End: 2022-07-06

## 2022-07-06 VITALS
OXYGEN SATURATION: 97 % | SYSTOLIC BLOOD PRESSURE: 142 MMHG | HEIGHT: 67 IN | TEMPERATURE: 98.7 F | HEART RATE: 71 BPM | WEIGHT: 293 LBS | BODY MASS INDEX: 45.99 KG/M2 | DIASTOLIC BLOOD PRESSURE: 70 MMHG

## 2022-07-06 DIAGNOSIS — H60.331 ACUTE SWIMMER'S EAR OF RIGHT SIDE: Primary | ICD-10-CM

## 2022-07-06 DIAGNOSIS — J45.20 MILD INTERMITTENT ASTHMA WITHOUT COMPLICATION: ICD-10-CM

## 2022-07-06 PROCEDURE — 99213 OFFICE O/P EST LOW 20 MIN: CPT

## 2022-07-06 RX ORDER — ALBUTEROL SULFATE 90 UG/1
2 AEROSOL, METERED RESPIRATORY (INHALATION) EVERY 4 HOURS PRN
Qty: 18 G | Refills: 3 | Status: SHIPPED | OUTPATIENT
Start: 2022-07-06

## 2022-07-06 RX ORDER — OFLOXACIN 3 MG/ML
5 SOLUTION AURICULAR (OTIC) 2 TIMES DAILY
Qty: 10 ML | Refills: 0 | Status: SHIPPED | OUTPATIENT
Start: 2022-07-06 | End: 2022-11-18

## 2022-07-06 NOTE — PROGRESS NOTES
"Chief Complaint  Earache (Right ear, 7/10 pain scale - symptoms began Sunday 07/03/2022.), Med Refill (Albuterol Inhaler), Form ([facility] BP Monitor Request Form), and Letter for School/Work (Pt was seen in clinic today -work note request)    Subjective     {Problem List  Visit Diagnosis   Encounters  Notes  Medications  Labs  Result Review Imaging  Media :23}     Leonarda Hogan presents to [facility]  History of Present Illness    Leonarda is here for a right ear ache that she reports is a 7/10 on the pain scale. She reports the symptoms started Sunday. No congestion, coughing, fevers. Just the right ear is causing discomfort.    She is also requesting a refill of her albuterol inhaler; she denies having to use it frequently she does says she knows she is on this.     She is needing a form filled out for a BP Monitor request through insurance.     She is also needing a work note to return to work today from todays visit.     Objective   Vital Signs:   /70 (BP Location: Left arm, Patient Position: Sitting, Cuff Size: Large Adult)   Pulse 71   Temp 98.7 °F (37.1 °C) (Oral)   Ht 170.2 cm (67\")   Wt (!) 154 kg (339 lb)   SpO2 97%   BMI 53.09 kg/m²     Physical Exam  Vitals reviewed.   Constitutional:       Appearance: Normal appearance. She is well-developed.   HENT:      Head: Normocephalic and atraumatic.      Right Ear: Drainage and tenderness present. Tympanic membrane is erythematous.      Left Ear: Tympanic membrane is erythematous.      Ears:      Comments: Drainage noted in the right ear canal.     Mouth/Throat:      Pharynx: No oropharyngeal exudate.   Eyes:      Conjunctiva/sclera: Conjunctivae normal.      Pupils: Pupils are equal, round, and reactive to light.   Cardiovascular:      Rate and Rhythm: Normal rate and regular rhythm.      Heart sounds: No murmur heard.    No friction rub. No gallop.   Pulmonary:      Effort: Pulmonary effort is " normal.      Breath sounds: Normal breath sounds. No wheezing or rhonchi.   Skin:     General: Skin is warm and dry.   Neurological:      Mental Status: She is alert and oriented to person, place, and time.   Psychiatric:         Mood and Affect: Affect normal.        Result Review :          Procedures      Assessment and Plan    Diagnoses and all orders for this visit:    1. Acute swimmer's ear of right side (Primary)  Comments:  We will treat with antibiotic drops for 5-7 days.  Patient to follow-up or contact the office if symptoms do not improve with drops.  Orders:  -     ofloxacin (FLOXIN) 0.3 % otic solution; Administer 5 drops to the right ear 2 (Two) Times a Day.  Dispense: 10 mL; Refill: 0    2. Mild intermittent asthma without complication  Comments:  Refill sent in for albuterol inhaler.  Orders:  -     albuterol sulfate  (90 Base) MCG/ACT inhaler; Inhale 2 puffs Every 4 (Four) Hours As Needed for Wheezing or Shortness of Air.  Dispense: 18 g; Refill: 3      Patient was instructed and educated on their diagnoses and treatment plan prior to leaving the office. If symptoms worsen or persist, seek emergency medical attention. Take all medications as prescribed. Call the office if any questions or concerns arise.           Follow Up   Return if symptoms worsen or fail to improve.  Patient was given instructions and counseling regarding her condition or for health maintenance advice. Please see specific information pulled into the AVS if appropriate.

## 2022-08-30 ENCOUNTER — OFFICE VISIT (OUTPATIENT)
Dept: OBSTETRICS AND GYNECOLOGY | Facility: CLINIC | Age: 37
End: 2022-08-30

## 2022-08-30 VITALS
HEIGHT: 67 IN | BODY MASS INDEX: 45.99 KG/M2 | DIASTOLIC BLOOD PRESSURE: 87 MMHG | WEIGHT: 293 LBS | SYSTOLIC BLOOD PRESSURE: 156 MMHG | HEART RATE: 79 BPM

## 2022-08-30 DIAGNOSIS — Z01.419 ENCOUNTER FOR GYNECOLOGICAL EXAMINATION WITHOUT ABNORMAL FINDING: Primary | ICD-10-CM

## 2022-08-30 PROCEDURE — G0123 SCREEN CERV/VAG THIN LAYER: HCPCS | Performed by: OBSTETRICS & GYNECOLOGY

## 2022-08-30 PROCEDURE — 87624 HPV HI-RISK TYP POOLED RSLT: CPT | Performed by: OBSTETRICS & GYNECOLOGY

## 2022-08-30 PROCEDURE — 99395 PREV VISIT EST AGE 18-39: CPT | Performed by: OBSTETRICS & GYNECOLOGY

## 2022-08-30 NOTE — PROGRESS NOTES
"Well Woman Visit    CC: Annual well woman exam       HPI:   37 y.o.No obstetric history on file. Contraception or HRT: Contraception:  None  Menses:   q mon, lasts 5 days, changes products q 3hrs on heaviest days.   Pain:  Moderate, not too bad  Incontinence concerns: No  Hx of abnormal pap:  No  Pt has no complaints today.      History: PMHx, Meds, Allergies, PSHx, Social Hx, and POBHx all reviewed and updated.      PHYSICAL EXAM:  /87   Pulse 79   Ht 170.2 cm (67\")   Wt (!) 153 kg (338 lb)   LMP 08/23/2022 (Approximate)   BMI 52.94 kg/m²   General- NAD, alert and oriented, appropriate  Psych- Normal mood, good memory  Neck- No masses, no thyroid enlargement  CV- Regular rhythm, no murnurs  Resp- CTA to bases, no wheezes  Abdomen- Soft, non distended, non tender, no masses    Breast left-  Bilaterally symmetrical, no masses, non tender, no nipple discharge  Breast right- Bilaterally symmetrical, no masses, non tender, no nipple discharge    External genitalia- Normal female, no lesions  Urethra/meatus- Normal, no masses, non tender, no prolapse  Bladder- Normal, no masses, non tender, no prolapse  Vagina- Normal, no atrophy, no lesions, no discharge, no prolapse  Cvx- Normal, no lesions, no discharge, No cervical motion tenderness  Uterus- Normal size, shape & consistency.  Non tender, mobile, & no prolapse, difficult to palpate  Adnexa- No mass, non tender, Difficult to palpate  Anus/Rectum/Perineum- Not performed    Lymphatic- No palpable neck, axillary, or groin nodes  Ext- No edema, no cyanosis    Skin- No lesions, no rashes, no acanthosis nigricans        ASSESSMENT and PLAN:  WWE    Diagnoses and all orders for this visit:    1. Encounter for gynecological examination without abnormal finding (Primary)  -     IGP,rfx Aptima HPV All Pth    declines bc.    Counseling:     Track menses, RTO IF <q21d, >7d long, or heavy    Domestic violence/abuse screen: negative    Depression screen: no " SI    Preventative:   BREAST HEALTH- Monthly self breast exam importance and how to reviewed. MMG and/or MRI (prn) reviewed per society guidelines and her individual history. Mammo/MRI screen: Not medically needed.  CERVICAL CANCER Screening- Reviewed current ASCCP guidelines for screening w and wo cotest HPV, age specific.  Screen: Updated today.  COLON CANCER Screening- Reviewed current medical society guidelines and options.  Colonoscopy screen:  Not medically needed.  SEXUAL HEALTH: Declines STD screening.  VACCINATIONS Recommended: Flu annually, Gardisil/HPV vaccine (up to 46yo).  Importance discussed, risk being unvaccinated reviewed.  Questions answered  Smoking status- NON SMOKER.  Importance of avoiding second hand smoke.  Follow up PCP/Specialist PMHx and Labs  Gardasil status: completed.      She understands the importance of having any ordered tests to be performed in a timely fashion.  She is encouraged to review her results online and/or contact or office if she has questions.     Follow Up:  Return if symptoms worsen or fail to improve.      Elizabeth Lara, APRN  08/30/2022

## 2022-08-31 ENCOUNTER — TRANSCRIBE ORDERS (OUTPATIENT)
Dept: ADMINISTRATIVE | Facility: HOSPITAL | Age: 37
End: 2022-08-31

## 2022-08-31 DIAGNOSIS — R51.9 NONINTRACTABLE HEADACHE, UNSPECIFIED CHRONICITY PATTERN, UNSPECIFIED HEADACHE TYPE: ICD-10-CM

## 2022-08-31 DIAGNOSIS — H47.10 UNSPECIFIED PAPILLEDEMA: Primary | ICD-10-CM

## 2022-09-08 LAB
CONV .: ABNORMAL
CYTOLOGIST CVX/VAG CYTO: ABNORMAL
CYTOLOGY CVX/VAG DOC CYTO: ABNORMAL
CYTOLOGY CVX/VAG DOC THIN PREP: ABNORMAL
DX ICD CODE: ABNORMAL
DX ICD CODE: ABNORMAL
HIV 1 & 2 AB SER-IMP: ABNORMAL
HPV I/H RISK 4 DNA CVX QL PROBE+SIG AMP: POSITIVE
OTHER STN SPEC: ABNORMAL
PATHOLOGIST CVX/VAG CYTO: ABNORMAL
RECOM F/U CVX/VAG CYTO: ABNORMAL
STAT OF ADQ CVX/VAG CYTO-IMP: ABNORMAL

## 2022-09-20 ENCOUNTER — TELEPHONE (OUTPATIENT)
Dept: INTERNAL MEDICINE | Facility: CLINIC | Age: 37
End: 2022-09-20

## 2022-09-20 ENCOUNTER — OFFICE VISIT (OUTPATIENT)
Dept: INTERNAL MEDICINE | Facility: CLINIC | Age: 37
End: 2022-09-20

## 2022-09-20 VITALS
SYSTOLIC BLOOD PRESSURE: 138 MMHG | TEMPERATURE: 98.1 F | WEIGHT: 293 LBS | DIASTOLIC BLOOD PRESSURE: 80 MMHG | HEIGHT: 67 IN | OXYGEN SATURATION: 98 % | HEART RATE: 77 BPM | BODY MASS INDEX: 45.99 KG/M2

## 2022-09-20 DIAGNOSIS — R14.0 BLOATING: ICD-10-CM

## 2022-09-20 DIAGNOSIS — R14.3 EXCESSIVE GAS: Primary | ICD-10-CM

## 2022-09-20 PROCEDURE — 99213 OFFICE O/P EST LOW 20 MIN: CPT | Performed by: NURSE PRACTITIONER

## 2022-09-20 RX ORDER — HYOSCYAMINE SULFATE 0.12 MG/1
0.12 TABLET SUBLINGUAL 4 TIMES DAILY PRN
Qty: 120 EACH | Refills: 1 | Status: SHIPPED | OUTPATIENT
Start: 2022-09-20 | End: 2022-12-02

## 2022-09-20 NOTE — PROGRESS NOTES
"Chief Complaint  Gas and STOAMACH NOISES  (NOT A GROWLING NOISE NO REPORT OF PAIN OR CHANGE IN STOOL )    Subjective        Leonarda Hogan presents to [facility] for History of Present Illness  Concerns for excess gas, bloating.  Patient reports that she was diagnosed with lactose intolerance a few years ago, she thinks also that she could have been partially diagnosed with celiac disease, however never formally.  She does report that her symptoms had resolved for about a year, they have recently returned, where she feels some bloating, and excess gas, constant stomach noises.  She does not report any significant pain, no nausea vomiting or diarrhea.  Her diet has been a little bit off as of lately, he is wanting to possibly get on medication that she had been on previously.  No other significant concerns or complaints today.        Objective   Vital Signs:   /80 (BP Location: Left arm, Patient Position: Sitting, Cuff Size: Large Adult)   Pulse 77   Temp 98.1 °F (36.7 °C) (Temporal)   Ht 170.2 cm (67\")   Wt (!) 155 kg (341 lb 12.8 oz)   SpO2 98%   BMI 53.53 kg/m²     Physical Exam  Vitals and nursing note reviewed.   Constitutional:       Appearance: Normal appearance. She is obese.   HENT:      Head: Normocephalic and atraumatic.   Pulmonary:      Effort: Pulmonary effort is normal.   Neurological:      Mental Status: She is alert.   Psychiatric:         Mood and Affect: Mood normal.         Thought Content: Thought content normal.        Result Review :  {The following data was reviewed by PAM Mtz on 09/20/22                Diagnoses and all orders for this visit:    1. Excessive gas (Primary)    2. Bloating    Other orders  -     Hyoscyamine Sulfate SL (Levsin/SL) 0.125 MG sublingual tablet; Place 0.125 mg under the tongue 4 (Four) Times a Day As Needed (Excess gas or bloating).  Dispense: 120 each; Refill: 1    Advised patient to get back on a good healthy diet " that was recommended at the time of diagnosis and that we will send in medication, Levsin, she can use that as needed for her symptoms.  I do not see any red flags for need for referral to GI at this time, will give the patient some opportunities to make some changes initially.  Patient had been on Protonix as well in the past, I did advise her we will hold off on starting that back for now, if she has worsening or persistent symptoms with labs and then we can consider that in the future.  Patient agrees and understands.  Follow-up if needed.      Follow Up   No follow-ups on file.  Patient was given instructions and counseling regarding her condition or for health maintenance advice. Please see specific information pulled into the AVS if appropriate.     David Whalen, APRN  9/20/2022  This note was electronically signed.

## 2022-09-20 NOTE — TELEPHONE ENCOUNTER
Per David Whalen If you will please call her and let her know that I will send in the Levsin initially, I would like for her to use that for the next few weeks and see if it gives her proper relief.  She is also going to make some dietary changes, if she is still having issues, we could add the Protonix daily as well.   Spoke with patient regarding this patient understood.

## 2022-09-26 ENCOUNTER — HOSPITAL ENCOUNTER (OUTPATIENT)
Dept: MRI IMAGING | Facility: HOSPITAL | Age: 37
Discharge: HOME | End: 2022-09-26

## 2022-09-26 DIAGNOSIS — H47.10 UNSPECIFIED PAPILLEDEMA: ICD-10-CM

## 2022-09-26 DIAGNOSIS — R51.9 NONINTRACTABLE HEADACHE, UNSPECIFIED CHRONICITY PATTERN, UNSPECIFIED HEADACHE TYPE: ICD-10-CM

## 2022-09-30 ENCOUNTER — HOSPITAL ENCOUNTER (OUTPATIENT)
Dept: MRI IMAGING | Facility: HOSPITAL | Age: 37
Discharge: HOME | End: 2022-09-30

## 2022-09-30 PROCEDURE — 70553 MRI BRAIN STEM W/O & W/DYE: CPT

## 2022-09-30 PROCEDURE — 0 GADOBENATE DIMEGLUMINE 529 MG/ML SOLUTION: Performed by: OPHTHALMOLOGY

## 2022-09-30 PROCEDURE — A9577 INJ MULTIHANCE: HCPCS | Performed by: OPHTHALMOLOGY

## 2022-09-30 PROCEDURE — 70543 MRI ORBT/FAC/NCK W/O &W/DYE: CPT

## 2022-09-30 RX ADMIN — GADOBENATE DIMEGLUMINE 20 ML: 529 INJECTION, SOLUTION INTRAVENOUS at 14:56

## 2022-10-03 ENCOUNTER — TRANSCRIBE ORDERS (OUTPATIENT)
Dept: ADMINISTRATIVE | Facility: HOSPITAL | Age: 37
End: 2022-10-03

## 2022-10-03 DIAGNOSIS — H47.11 PAPILLEDEMA ASSOCIATED WITH INCREASED INTRACRANIAL PRESSURE: Primary | ICD-10-CM

## 2022-10-18 ENCOUNTER — OFFICE VISIT (OUTPATIENT)
Dept: OBSTETRICS AND GYNECOLOGY | Facility: CLINIC | Age: 37
End: 2022-10-18

## 2022-10-18 VITALS
HEART RATE: 85 BPM | BODY MASS INDEX: 45.99 KG/M2 | DIASTOLIC BLOOD PRESSURE: 91 MMHG | SYSTOLIC BLOOD PRESSURE: 159 MMHG | HEIGHT: 67 IN | WEIGHT: 293 LBS

## 2022-10-18 DIAGNOSIS — R87.810 ASCUS WITH POSITIVE HIGH RISK HPV CERVICAL: Primary | ICD-10-CM

## 2022-10-18 DIAGNOSIS — Z32.02 PREGNANCY EXAMINATION OR TEST, NEGATIVE RESULT: ICD-10-CM

## 2022-10-18 DIAGNOSIS — R87.610 ASCUS WITH POSITIVE HIGH RISK HPV CERVICAL: Primary | ICD-10-CM

## 2022-10-18 LAB
B-HCG UR QL: NEGATIVE
EXPIRATION DATE: NORMAL
INTERNAL NEGATIVE CONTROL: NEGATIVE
INTERNAL POSITIVE CONTROL: NORMAL
Lab: NORMAL

## 2022-10-18 PROCEDURE — 81025 URINE PREGNANCY TEST: CPT | Performed by: NURSE PRACTITIONER

## 2022-10-18 PROCEDURE — 57456 ENDOCERV CURETTAGE W/SCOPE: CPT | Performed by: NURSE PRACTITIONER

## 2022-10-18 PROCEDURE — 88305 TISSUE EXAM BY PATHOLOGIST: CPT | Performed by: NURSE PRACTITIONER

## 2022-10-18 NOTE — PROGRESS NOTES
Colposcopy Procedure Note  Procedures    Indications: Leonarda Hogan is a 37 y.o. female, No obstetric history on file., whose Patient's last menstrual period was 09/22/2022 (exact date)..  She presents for follow up for evaluation of an abnormal PAP smear that showed ASCUS, +HPV.  She understands the need for the procedure and is aware of the complications, including post-colposcopic vaginal bleeding, vaginal leukorrhea or cervicitis.  She is aware she may experience discomfort.  After being presented with the risk, benefits, and alternatives the patient wished to proceed.    Previous hx of abnormal pap  - yes, 8-9 years ago, unsure of result, had a repeat pap 6 months later that was negative  Age at coitarche  - 19  Number of LSP  - 5  STI hx  - denies   Current contraception  - condoms    Prior cervical treatment: no treatment.  Urine pregnancy test is negative  Procedure Details   The risks and benefits of the procedure and informed consent obtained.  She was positioned in the dorsal lithotomy position and a speculum was inserted into the vagina and excellent visualization of cervix achieved, cervix swabbed x 3 with acetic acid solution and with Lugol's solution.  The transformation zone was not completely visualized.  Cervical Biopsy Performed?  no  An endocervical curettage was performed.  This colposcopy was satisfactory.     Findings: No decreased uptake of lugol's or acetowhitening noted  The procedure was notable for:  Physical Exam  Vitals and nursing note reviewed. Exam conducted with a chaperone present.   Constitutional:       Appearance: Normal appearance. She is well-developed and well-groomed.   HENT:      Head: Normocephalic.   Eyes:      Pupils: Pupils are equal, round, and reactive to light.   Genitourinary:     General: Normal vulva.      Exam position: Lithotomy position.      Vagina: Normal.      Cervix: Normal.         Skin:     General: Skin is warm and dry.   Neurological:      General: No  focal deficit present.      Mental Status: She is alert.         Specimens: ECC  Specimens labelled and sent to Pathology.    Complications: none.    Assessment and Plan    Problem List Items Addressed This Visit        Genitourinary and Reproductive     ASCUS with positive high risk HPV cervical - Primary    Relevant Orders    Tissue Pathology Exam   Other Visit Diagnoses     Pregnancy examination or test, negative result        Relevant Orders    POC Pregnancy, Urine (Completed)          1. Will base further treatment on Pathology findings.  2. Treatment options discussed with patient.  3. Post biopsy instructions given to patient.    Porter Gallagher, APRN  10/18/2022

## 2022-10-19 ENCOUNTER — APPOINTMENT (OUTPATIENT)
Dept: INTERVENTIONAL RADIOLOGY/VASCULAR | Facility: HOSPITAL | Age: 37
End: 2022-10-19

## 2022-10-20 ENCOUNTER — TRANSCRIBE ORDERS (OUTPATIENT)
Dept: LAB | Facility: HOSPITAL | Age: 37
End: 2022-10-20

## 2022-10-20 ENCOUNTER — LAB (OUTPATIENT)
Dept: LAB | Facility: HOSPITAL | Age: 37
End: 2022-10-20

## 2022-10-20 DIAGNOSIS — H47.11 PAPILLEDEMA ASSOCIATED WITH INCREASED INTRACRANIAL PRESSURE: ICD-10-CM

## 2022-10-20 DIAGNOSIS — H47.11 PAPILLEDEMA ASSOCIATED WITH INCREASED INTRACRANIAL PRESSURE: Primary | ICD-10-CM

## 2022-10-20 LAB
APTT PPP: 31.2 SECONDS (ref 24.2–34.2)
BASOPHILS # BLD AUTO: 0.01 10*3/MM3 (ref 0–0.2)
BASOPHILS NFR BLD AUTO: 0.2 % (ref 0–1.5)
CYTO UR: NORMAL
DEPRECATED RDW RBC AUTO: 41.8 FL (ref 37–54)
EOSINOPHIL # BLD AUTO: 0.12 10*3/MM3 (ref 0–0.4)
EOSINOPHIL NFR BLD AUTO: 2.5 % (ref 0.3–6.2)
ERYTHROCYTE [DISTWIDTH] IN BLOOD BY AUTOMATED COUNT: 14.5 % (ref 12.3–15.4)
HCT VFR BLD AUTO: 36.5 % (ref 34–46.6)
HGB BLD-MCNC: 12 G/DL (ref 12–15.9)
IMM GRANULOCYTES # BLD AUTO: 0.02 10*3/MM3 (ref 0–0.05)
IMM GRANULOCYTES NFR BLD AUTO: 0.4 % (ref 0–0.5)
INR PPP: 0.93 (ref 0.86–1.15)
LAB AP CASE REPORT: NORMAL
LAB AP CLINICAL INFORMATION: NORMAL
LYMPHOCYTES # BLD AUTO: 2.09 10*3/MM3 (ref 0.7–3.1)
LYMPHOCYTES NFR BLD AUTO: 43.1 % (ref 19.6–45.3)
MCH RBC QN AUTO: 26.1 PG (ref 26.6–33)
MCHC RBC AUTO-ENTMCNC: 32.9 G/DL (ref 31.5–35.7)
MCV RBC AUTO: 79.3 FL (ref 79–97)
MONOCYTES # BLD AUTO: 0.35 10*3/MM3 (ref 0.1–0.9)
MONOCYTES NFR BLD AUTO: 7.2 % (ref 5–12)
NEUTROPHILS NFR BLD AUTO: 2.26 10*3/MM3 (ref 1.7–7)
NEUTROPHILS NFR BLD AUTO: 46.6 % (ref 42.7–76)
NRBC BLD AUTO-RTO: 0 /100 WBC (ref 0–0.2)
PATH REPORT.FINAL DX SPEC: NORMAL
PATH REPORT.GROSS SPEC: NORMAL
PLATELET # BLD AUTO: 303 10*3/MM3 (ref 140–450)
PMV BLD AUTO: 10.7 FL (ref 6–12)
PROTHROMBIN TIME: 12.5 SECONDS (ref 11.8–14.9)
RBC # BLD AUTO: 4.6 10*6/MM3 (ref 3.77–5.28)
WBC NRBC COR # BLD: 4.85 10*3/MM3 (ref 3.4–10.8)

## 2022-10-20 PROCEDURE — 85730 THROMBOPLASTIN TIME PARTIAL: CPT | Performed by: OPHTHALMOLOGY

## 2022-10-20 PROCEDURE — 36415 COLL VENOUS BLD VENIPUNCTURE: CPT | Performed by: OPHTHALMOLOGY

## 2022-10-20 PROCEDURE — 85610 PROTHROMBIN TIME: CPT

## 2022-10-20 PROCEDURE — 85025 COMPLETE CBC W/AUTO DIFF WBC: CPT

## 2022-10-21 ENCOUNTER — HOSPITAL ENCOUNTER (OUTPATIENT)
Dept: INTERVENTIONAL RADIOLOGY/VASCULAR | Facility: HOSPITAL | Age: 37
Discharge: HOME | End: 2022-10-21
Admitting: OPHTHALMOLOGY

## 2022-10-21 VITALS
DIASTOLIC BLOOD PRESSURE: 76 MMHG | SYSTOLIC BLOOD PRESSURE: 164 MMHG | HEART RATE: 67 BPM | RESPIRATION RATE: 18 BRPM | OXYGEN SATURATION: 98 %

## 2022-10-21 DIAGNOSIS — H47.11 PAPILLEDEMA ASSOCIATED WITH INCREASED INTRACRANIAL PRESSURE: ICD-10-CM

## 2022-10-21 LAB
APPEARANCE CSF: CLEAR
COLOR CSF: COLORLESS
GLUCOSE CSF-MCNC: 63 MG/DL (ref 40–70)
NUC CELL # CSF MANUAL: 4.44 /MM3 (ref 0–5)
PROT CSF-MCNC: 24.5 MG/DL (ref 15–45)
RBC # CSF MANUAL: 3.33 /MM3
TUBE # CSF: 4
XANTHOCHROMIA FLD QL: NORMAL

## 2022-10-21 PROCEDURE — 82945 GLUCOSE OTHER FLUID: CPT | Performed by: OPHTHALMOLOGY

## 2022-10-21 PROCEDURE — 89050 BODY FLUID CELL COUNT: CPT | Performed by: OPHTHALMOLOGY

## 2022-10-21 PROCEDURE — 84157 ASSAY OF PROTEIN OTHER: CPT | Performed by: OPHTHALMOLOGY

## 2022-10-21 RX ORDER — LIDOCAINE HYDROCHLORIDE 20 MG/ML
20 INJECTION, SOLUTION INFILTRATION; PERINEURAL ONCE
Status: DISCONTINUED | OUTPATIENT
Start: 2022-10-21 | End: 2022-10-22 | Stop reason: HOSPADM

## 2022-10-21 NOTE — NURSING NOTE
BANDAID TO MID LOWER BACK CDI, DENIES PAIN. ESCORTED PATIENT TO FACILITY ENTRANCE, AMBULATORY. TOLERATED WELL. DRIVEN HOME BY SPOUSE.

## 2022-10-21 NOTE — NURSING NOTE
ARRIVED TO RADIOLOGY HOLDING VIA STRETCHER ESCORTED BY [employer] TECH. A&OX3. BANDAID TO MID LOWER BACK CDI. DENIES PAIN.

## 2022-10-25 ENCOUNTER — TELEPHONE (OUTPATIENT)
Dept: INTERNAL MEDICINE | Facility: CLINIC | Age: 37
End: 2022-10-25

## 2022-11-03 ENCOUNTER — OFFICE VISIT (OUTPATIENT)
Dept: INTERNAL MEDICINE | Facility: CLINIC | Age: 37
End: 2022-11-03

## 2022-11-03 VITALS
HEIGHT: 67 IN | WEIGHT: 293 LBS | OXYGEN SATURATION: 99 % | HEART RATE: 76 BPM | TEMPERATURE: 98.1 F | BODY MASS INDEX: 45.99 KG/M2 | DIASTOLIC BLOOD PRESSURE: 88 MMHG | SYSTOLIC BLOOD PRESSURE: 148 MMHG

## 2022-11-03 DIAGNOSIS — G93.2 IIH (IDIOPATHIC INTRACRANIAL HYPERTENSION): ICD-10-CM

## 2022-11-03 DIAGNOSIS — E78.5 HYPERLIPIDEMIA, UNSPECIFIED HYPERLIPIDEMIA TYPE: ICD-10-CM

## 2022-11-03 DIAGNOSIS — J06.9 UPPER RESPIRATORY TRACT INFECTION, UNSPECIFIED TYPE: ICD-10-CM

## 2022-11-03 DIAGNOSIS — R05.1 ACUTE COUGH: ICD-10-CM

## 2022-11-03 DIAGNOSIS — I10 HYPERTENSION, UNSPECIFIED TYPE: Primary | ICD-10-CM

## 2022-11-03 PROCEDURE — 99214 OFFICE O/P EST MOD 30 MIN: CPT | Performed by: STUDENT IN AN ORGANIZED HEALTH CARE EDUCATION/TRAINING PROGRAM

## 2022-11-03 RX ORDER — BENZONATATE 100 MG/1
100 CAPSULE ORAL 3 TIMES DAILY PRN
Qty: 15 CAPSULE | Refills: 0 | Status: SHIPPED | OUTPATIENT
Start: 2022-11-03 | End: 2022-11-08

## 2022-11-03 RX ORDER — AMLODIPINE BESYLATE 10 MG/1
10 TABLET ORAL DAILY
Qty: 90 TABLET | Refills: 1 | Status: SHIPPED | OUTPATIENT
Start: 2022-11-03 | End: 2023-02-01

## 2022-11-03 RX ORDER — ATORVASTATIN CALCIUM 40 MG/1
40 TABLET, FILM COATED ORAL DAILY
Qty: 90 TABLET | Refills: 1 | Status: SHIPPED | OUTPATIENT
Start: 2022-11-03 | End: 2023-02-01

## 2022-11-03 RX ORDER — ACETAZOLAMIDE 250 MG/1
250 TABLET ORAL 2 TIMES DAILY
COMMUNITY
Start: 2022-10-24 | End: 2022-12-02

## 2022-11-03 RX ORDER — AZITHROMYCIN 250 MG/1
TABLET, FILM COATED ORAL
Qty: 6 TABLET | Refills: 0 | Status: SHIPPED | OUTPATIENT
Start: 2022-11-03 | End: 2022-11-18

## 2022-11-03 NOTE — TELEPHONE ENCOUNTER
Spoke with patient and Saira Saucedo regarding concerns on 11/2/22. Saira advised patient to take one 50mg Losartan in the morning, and one at night for a total of 100mg daily. Patient has appt today to speak with  about long term med management. Patient stated understanding yesterday regarding these changes.

## 2022-11-03 NOTE — PROGRESS NOTES
"Chief Complaint  Sore Throat (Going on 2 weeks /Tested for covid and strep on 10-24-22 but was negative ) and Hypertension (Having trouble with high blood pressure )    Subjective            Leonarda Hogan presents to [facility]  History of Present Illness    Sore throat and coughing:   Past 2 weeks. Cough is mostly at night.   Endorses nasal congestion.   Cough is productive, green initially and now yellow.   No fevers or chills.   Seen at  last Monday and strep and covid were negative.   Does not get the flu vaccine.   Unsure of sick contact.     Does have seasonal allergies, tx w/ otc agents.     Hypertension :   Chronic, and not well controlled.   States she had a recent spinal tap due to pt having abnormal eye exam with papilledema.   Concern for IIH.   She was started acetazolamide.     States she was on a water pill before but experienced cramping which caused her to come off.     States she has been feeling \"funny\" \"does not feel the same\", \"my blood pressure has been running high\" and she's gain weight since change to her medication.     She is on amlodipine 5m (ran out on in early Sept), and losartan 50mg           Past Medical History:   Diagnosis Date   • Acne    • Allergic    • Asthma    • Hyperlipidemia    • Hypertension    • Kidney stones    • Sinus trouble        Allergies:   No Known Allergies       Past Surgical History:   Procedure Laterality Date   • TONSILLECTOMY  2006   • WISDOM TOOTH EXTRACTION  2007   • WISDOM TOOTH EXTRACTION  2007          Social History     Socioeconomic History   • Marital status: Single   Tobacco Use   • Smoking status: Never   • Smokeless tobacco: Never   Vaping Use   • Vaping Use: Never used   Substance and Sexual Activity   • Alcohol use: Not Currently   • Drug use: Never   • Sexual activity: Yes     Partners: Male     Birth control/protection: Condom         Family History   Problem Relation Age of Onset   • Heart " disease Father    • Diabetes Sister    • Breast cancer Sister    • Heart disease Paternal Grandmother           Health Maintenance Due   Topic Date Due   • Pneumococcal Vaccine 0-64 (1 - PCV) Never done   • TDAP/TD VACCINES (1 - Tdap) Never done   • HEPATITIS C SCREENING  Never done   • COVID-19 Vaccine (4 - Booster for Moderna series) 03/30/2022   • INFLUENZA VACCINE  Never done            Current Outpatient Medications:   •  acetaZOLAMIDE (DIAMOX) 250 MG tablet, Take 1 tablet by mouth Daily., Disp: , Rfl:   •  albuterol sulfate  (90 Base) MCG/ACT inhaler, Inhale 2 puffs Every 4 (Four) Hours As Needed for Wheezing or Shortness of Air., Disp: 18 g, Rfl: 3  •  Arazlo 0.045 % lotion, , Disp: , Rfl:   •  atorvastatin (LIPITOR) 40 MG tablet, Take 1 tablet by mouth Daily for 90 days., Disp: 90 tablet, Rfl: 1  •  Hyoscyamine Sulfate SL (Levsin/SL) 0.125 MG sublingual tablet, Place 0.125 mg under the tongue 4 (Four) Times a Day As Needed (Excess gas or bloating)., Disp: 120 each, Rfl: 1  •  losartan (COZAAR) 50 MG tablet, Take 1 tablet by mouth Daily., Disp: 90 tablet, Rfl: 1  •  ofloxacin (FLOXIN) 0.3 % otic solution, Administer 5 drops to the right ear 2 (Two) Times a Day., Disp: 10 mL, Rfl: 0  •  amLODIPine (NORVASC) 10 MG tablet, Take 1 tablet by mouth Daily for 90 days., Disp: 90 tablet, Rfl: 1  •  Amzeeq 4 % foam, , Disp: , Rfl:   •  azithromycin (ZITHROMAX) 250 MG tablet, Take 2 tab on day 1, then 1 tablet daily thereafter, Disp: 6 tablet, Rfl: 0  •  benzonatate (Tessalon Perles) 100 MG capsule, Take 1 capsule by mouth 3 (Three) Times a Day As Needed for Cough for up to 5 days., Disp: 15 capsule, Rfl: 0  •  doxycycline (VIBRAMYICN) 100 MG tablet, Take 100 mg by mouth As Needed., Disp: , Rfl:       Immunization History   Administered Date(s) Administered   • COVID-19 (MODERNA) 1st, 2nd, 3rd Dose Only 07/08/2021, 08/05/2021   • COVID-19 (MODERNA) BOOSTER 02/02/2022         Review of Systems       Objective  "      Vitals:    11/03/22 1607   BP: 148/88   BP Location: Left arm   Patient Position: Sitting   Cuff Size: Adult   Pulse: 76   Temp: 98.1 °F (36.7 °C)   TempSrc: Infrared   SpO2: 99%   Weight: (!) 156 kg (344 lb 6 oz)   Height: 170.2 cm (67.01\")     Body mass index is 53.92 kg/m².      Physical Exam  Vitals reviewed.   Constitutional:       Appearance: Normal appearance.   HENT:      Head: Normocephalic and atraumatic.      Nose: Congestion present.      Mouth/Throat:      Pharynx: No oropharyngeal exudate.   Eyes:      Extraocular Movements: Extraocular movements intact.      Conjunctiva/sclera: Conjunctivae normal.   Cardiovascular:      Rate and Rhythm: Normal rate and regular rhythm.      Pulses: Normal pulses.      Heart sounds: Normal heart sounds.   Pulmonary:      Effort: Pulmonary effort is normal. No respiratory distress.      Breath sounds: Normal breath sounds.   Musculoskeletal:         General: Normal range of motion.   Skin:     General: Skin is warm and dry.   Neurological:      General: No focal deficit present.      Mental Status: She is alert and oriented to person, place, and time.      Cranial Nerves: No cranial nerve deficit.   Psychiatric:         Mood and Affect: Mood normal.         Behavior: Behavior normal.         Thought Content: Thought content normal.             Result Review :                           Assessment and Plan      Diagnoses and all orders for this visit:    1. Hypertension, unspecified type (Primary)  Comments:  Chronic, not well controlled. Increasing amlodipine to 10mg daily. Continue losartan at 50mg daily. Pt to keep log of bp and bring for review at f/u.   Orders:  -     amLODIPine (NORVASC) 10 MG tablet; Take 1 tablet by mouth Daily for 90 days.  Dispense: 90 tablet; Refill: 1    2. Upper respiratory tract infection, unspecified type  Comments:  Symptoms persistent for past 2 weeks, Zpack sent in for concern for possible bacterial sinusitis w/ pnd causing cough. " Reasons to return discussed.   Orders:  -     azithromycin (ZITHROMAX) 250 MG tablet; Take 2 tab on day 1, then 1 tablet daily thereafter  Dispense: 6 tablet; Refill: 0    3. Acute cough  Comments:  See above. Tessalon perles sent in.     Orders:  -     benzonatate (Tessalon Perles) 100 MG capsule; Take 1 capsule by mouth 3 (Three) Times a Day As Needed for Cough for up to 5 days.  Dispense: 15 capsule; Refill: 0    4. IIH (idiopathic intracranial hypertension)  Comments:  New diagnosis, on acetazolamide which she is tolerating well. Following w/ ophthalmology. Reviewed recent LP (10/21) were opening pressure was 26cm.     5. Hyperlipidemia, unspecified hyperlipidemia type  Comments:  Chronic, refilled med.   Orders:  -     atorvastatin (LIPITOR) 40 MG tablet; Take 1 tablet by mouth Daily for 90 days.  Dispense: 90 tablet; Refill: 1            Follow Up     Return in about 2 weeks (around 11/17/2022) for HTN.    Patient was given instructions and counseling regarding her condition or for health maintenance advice. Please see specific information pulled into the AVS if appropriate.     Shirley Cook MD   Internal Medicine-Pediatrics

## 2022-11-18 ENCOUNTER — OFFICE VISIT (OUTPATIENT)
Dept: INTERNAL MEDICINE | Facility: CLINIC | Age: 37
End: 2022-11-18

## 2022-11-18 VITALS
SYSTOLIC BLOOD PRESSURE: 146 MMHG | WEIGHT: 293 LBS | TEMPERATURE: 98.9 F | DIASTOLIC BLOOD PRESSURE: 84 MMHG | BODY MASS INDEX: 45.99 KG/M2 | HEART RATE: 77 BPM | RESPIRATION RATE: 18 BRPM | OXYGEN SATURATION: 99 % | HEIGHT: 67 IN

## 2022-11-18 DIAGNOSIS — G93.2 IIH (IDIOPATHIC INTRACRANIAL HYPERTENSION): ICD-10-CM

## 2022-11-18 DIAGNOSIS — I10 ESSENTIAL HYPERTENSION: Primary | ICD-10-CM

## 2022-11-18 DIAGNOSIS — R06.83 SNORING: ICD-10-CM

## 2022-11-18 PROCEDURE — 99214 OFFICE O/P EST MOD 30 MIN: CPT | Performed by: NURSE PRACTITIONER

## 2022-11-18 RX ORDER — HYDROCHLOROTHIAZIDE 25 MG/1
25 TABLET ORAL DAILY
Qty: 30 TABLET | Refills: 1 | Status: SHIPPED | OUTPATIENT
Start: 2022-11-18 | End: 2022-12-02

## 2022-11-18 NOTE — PROGRESS NOTES
"Chief Complaint  Hypertension (2 week follow up ) and Snoring    Subjective         Leonarda Hogan presents to [facility]  HTN-  Patient in clinic for two week follow up on HTN. Amlodipine was restarted at previous visit, losartan was continued at current dosage. Patient reports home readings 130s-140s/80-90s. Patient would like to go back on HCTZ, states her blood pressure was well controlled with systolic numbers less than 130 when she was on this medication combination in the past. Denies chest pain, blurry vision, headache, leg swelling.     Patient states she went to the eye doctor, had swollen vessels and was diagnosed with idiopathic intracranial hypertension. MRI of the brain was normal, patient was started on Diamox per ophthalmology. Patient states plan is to watch closely and wean off of the medication in the future, scheduled for follow up next month.     Snoring-  Patient reports over the past month she has noticed that she is snoring more than normal. She is up multiples times at night and does not feel rested when she wakes up in the mornings.  She is wondering if she needs to be evaluated by sleep medicine.       Objective     Vitals:    11/18/22 1432   BP: 146/84   BP Location: Right arm   Patient Position: Sitting   Cuff Size: Large Adult   Pulse: 77   Resp: 18   Temp: 98.9 °F (37.2 °C)   SpO2: 99%   Weight: (!) 155 kg (341 lb)   Height: 170.2 cm (67.01\")      Body mass index is 53.39 kg/m².    Wt Readings from Last 3 Encounters:   11/18/22 (!) 155 kg (341 lb)   11/03/22 (!) 156 kg (344 lb 6 oz)   10/18/22 (!) 159 kg (351 lb)     BP Readings from Last 3 Encounters:   11/18/22 146/84   11/03/22 148/88   10/21/22 164/76                Physical Exam  Constitutional:       Appearance: Normal appearance.   HENT:      Head: Normocephalic and atraumatic.      Nose: Nose normal.      Mouth/Throat:      Mouth: Mucous membranes are moist.      Pharynx: " Oropharynx is clear.   Eyes:      Extraocular Movements: Extraocular movements intact.      Conjunctiva/sclera: Conjunctivae normal.      Pupils: Pupils are equal, round, and reactive to light.   Neck:      Thyroid: No thyroid mass, thyromegaly or thyroid tenderness.   Cardiovascular:      Rate and Rhythm: Normal rate and regular rhythm.      Heart sounds: Normal heart sounds.   Pulmonary:      Effort: Pulmonary effort is normal.      Breath sounds: Normal breath sounds.   Skin:     General: Skin is warm and dry.   Neurological:      General: No focal deficit present.      Mental Status: She is alert and oriented to person, place, and time.   Psychiatric:         Mood and Affect: Mood normal.         Behavior: Behavior normal.         Thought Content: Thought content normal.          Result Review :   The following data was reviewed by: PAM Sherwood on 11/18/2022:      Procedures    Assessment and Plan   Diagnoses and all orders for this visit:    1. Essential hypertension (Primary)  Assessment & Plan:  Persistent elevations. Continue amlodipine. Discussed with patient that HCTZ is unlikely to improve blood pressure readings as much as losartan, however due to history of this working well will discontinue losartan and restart HCTZ. Will follow up in two weeks to review blood pressure log. If blood pressure consistently greater than 130/80 will consider losartan-HCTZ combination.  Patient agreeable to plan, will call or return to clinic sooner with concerns.      2. IIH (idiopathic intracranial hypertension)  Assessment & Plan:  Continue Diamox, follow-up with ophthalmology as scheduled.      3. Snoring  Assessment & Plan:  Referral to sleep medicine for further evaluation.    Orders:  -     Ambulatory Referral to Sleep Medicine    Other orders  -     hydroCHLOROthiazide (HYDRODIURIL) 25 MG tablet; Take 1 tablet by mouth Daily.  Dispense: 30 tablet; Refill: 1        Follow Up   Return in about 2 weeks (around  12/2/2022).  Patient was given instructions and counseling regarding her condition or for health maintenance advice. Please see specific information pulled into the AVS if appropriate.

## 2022-11-18 NOTE — ASSESSMENT & PLAN NOTE
Persistent elevations. Continue amlodipine. Discussed with patient that HCTZ is unlikely to improve blood pressure readings as much as losartan, however due to history of this working well will discontinue losartan and restart HCTZ. Will follow up in two weeks to review blood pressure log. If blood pressure consistently greater than 130/80 will consider losartan-HCTZ combination.  Patient agreeable to plan, will call or return to clinic sooner with concerns.

## 2022-11-23 ENCOUNTER — TELEPHONE (OUTPATIENT)
Dept: INTERNAL MEDICINE | Facility: CLINIC | Age: 37
End: 2022-11-23

## 2022-11-23 NOTE — TELEPHONE ENCOUNTER
Caller: EdisonLeonarda    Relationship: Self    Best call back number:[phone]    What is the medical concern/diagnosis: N/A    What specialty or service is being requested:SLEEP DOCTOR    What is the provider, practice or medical service name: N/A    What is the office location: N/A    What is the office phone number: N/A    Any additional details: PATIENT WAS REFERRED TO A SLEEP DOCTOR. SHE COULD NOT GET IN TO SEE THIS PROVIDER UNTIL MID MARCH. SHE WOULD LIKE TO CHANGE THE REFERRAL TO ANOTHER PROVIDER THAT SHE CAN SEE SOONER. PLEASE CALL PATIENT BACK AND SCHEDULE/AADVISE.

## 2022-12-02 ENCOUNTER — OFFICE VISIT (OUTPATIENT)
Dept: INTERNAL MEDICINE | Facility: CLINIC | Age: 37
End: 2022-12-02

## 2022-12-02 VITALS
BODY MASS INDEX: 45.99 KG/M2 | HEIGHT: 67 IN | SYSTOLIC BLOOD PRESSURE: 130 MMHG | WEIGHT: 293 LBS | DIASTOLIC BLOOD PRESSURE: 72 MMHG | OXYGEN SATURATION: 100 % | TEMPERATURE: 98.2 F | HEART RATE: 82 BPM

## 2022-12-02 DIAGNOSIS — I10 ESSENTIAL HYPERTENSION: Primary | ICD-10-CM

## 2022-12-02 PROCEDURE — 99213 OFFICE O/P EST LOW 20 MIN: CPT | Performed by: NURSE PRACTITIONER

## 2022-12-02 RX ORDER — LOSARTAN POTASSIUM 100 MG/1
100 TABLET ORAL DAILY
Qty: 30 TABLET | Refills: 1 | Status: SHIPPED | OUTPATIENT
Start: 2022-12-02 | End: 2023-02-17

## 2022-12-02 NOTE — ASSESSMENT & PLAN NOTE
Persistent elevations.  Discussed with patient that amlodipine is at maximum dosage, will discontinue HCTZ and increase losartan dosage to 100 mg.  Discussed the option of lisinopril as patient felt that losartan did not work well for her, she would prefer to increase losartan at this time.  She will continue to monitor blood pressure readings and call with consistent elevations greater than 130/80.  Follow-up in 1 month to review blood pressure log, sooner if concerns arise.

## 2022-12-02 NOTE — PROGRESS NOTES
"Chief Complaint  Hypertension    Subjective         Leonarda Hogan presents to [facility]  HTN-  Patient in clinic for 2-week follow-up on hypertension.  Per patient request HCTZ was started at previous visit and losartan discontinued.  Patient states that in the past HCTZ has caused her to have leg cramping, symptoms started again when she started medication recently.  Due to this patient has not taken HCTZ in a few days, has been taking amlodipine twice daily instead.  Reports home blood pressure readings 130s/90s.  Denies chest pain, blurry vision, headache, leg swelling.  She would like to stay off of HCTZ at this time and is wondering if amlodipine can be increased.       Objective     Vitals:    12/02/22 1206   BP: 130/72   BP Location: Right arm   Patient Position: Sitting   Cuff Size: Adult   Pulse: 82   Temp: 98.2 °F (36.8 °C)   TempSrc: Temporal   SpO2: 100%   Weight: (!) 157 kg (345 lb 12.8 oz)   Height: 170.2 cm (67\")      Body mass index is 54.16 kg/m².    Wt Readings from Last 3 Encounters:   12/02/22 (!) 157 kg (345 lb 12.8 oz)   11/18/22 (!) 155 kg (341 lb)   11/03/22 (!) 156 kg (344 lb 6 oz)     BP Readings from Last 3 Encounters:   12/02/22 130/72   11/18/22 146/84   11/03/22 148/88                Physical Exam  Constitutional:       Appearance: Normal appearance.   HENT:      Head: Normocephalic and atraumatic.      Nose: Nose normal.      Mouth/Throat:      Mouth: Mucous membranes are moist.      Pharynx: Oropharynx is clear.   Eyes:      Extraocular Movements: Extraocular movements intact.      Conjunctiva/sclera: Conjunctivae normal.      Pupils: Pupils are equal, round, and reactive to light.   Cardiovascular:      Rate and Rhythm: Normal rate and regular rhythm.      Heart sounds: Normal heart sounds.   Pulmonary:      Effort: Pulmonary effort is normal.      Breath sounds: Normal breath sounds.   Skin:     General: Skin is warm and dry. "   Neurological:      General: No focal deficit present.      Mental Status: She is alert and oriented to person, place, and time.   Psychiatric:         Mood and Affect: Mood normal.         Behavior: Behavior normal.         Thought Content: Thought content normal.          Result Review :   The following data was reviewed by: PAM Sherwood on 12/02/2022:      Procedures    Assessment and Plan   Diagnoses and all orders for this visit:    1. Essential hypertension (Primary)  Assessment & Plan:  Persistent elevations.  Discussed with patient that amlodipine is at maximum dosage, will discontinue HCTZ and increase losartan dosage to 100 mg.  Discussed the option of lisinopril as patient felt that losartan did not work well for her, she would prefer to increase losartan at this time.  She will continue to monitor blood pressure readings and call with consistent elevations greater than 130/80.  Follow-up in 1 month to review blood pressure log, sooner if concerns arise.      Other orders  -     losartan (Cozaar) 100 MG tablet; Take 1 tablet by mouth Daily.  Dispense: 30 tablet; Refill: 1        Follow Up   Return for Next scheduled follow up or sooner if concerns arise.  Patient was given instructions and counseling regarding her condition or for health maintenance advice. Please see specific information pulled into the AVS if appropriate.

## 2022-12-06 DIAGNOSIS — E78.5 HYPERLIPIDEMIA, UNSPECIFIED HYPERLIPIDEMIA TYPE: ICD-10-CM

## 2022-12-06 RX ORDER — ATORVASTATIN CALCIUM 40 MG/1
40 TABLET, FILM COATED ORAL DAILY
Qty: 90 TABLET | Refills: 1 | Status: CANCELLED | OUTPATIENT
Start: 2022-12-06 | End: 2023-03-06

## 2022-12-06 NOTE — TELEPHONE ENCOUNTER
Caller: Edison Leonarda    Relationship: Self    Best call back number: [phone]    Requested Prescriptions:   Requested Prescriptions     Pending Prescriptions Disp Refills   • atorvastatin (LIPITOR) 40 MG tablet 90 tablet 1     Sig: Take 1 tablet by mouth Daily for 90 days.        Pharmacy where request should be sent: [address] [phone] [facility] [phone] [facility]     Additional details provided by patient: Two days left on hand    Does the patient have less than a 3 day supply:  [x] Yes  [] No    Would you like a call back once the refill request has been completed: [x] Yes [] No    If the office needs to give you a call back, can they leave a voicemail: [x] Yes [] No    Corie Meneses Rep   12/06/22 12:28 EST

## 2022-12-16 ENCOUNTER — CLINICAL SUPPORT (OUTPATIENT)
Dept: INTERNAL MEDICINE | Facility: CLINIC | Age: 37
End: 2022-12-16

## 2022-12-16 NOTE — PROGRESS NOTES
Blood Pressure Check     Leonarda Hogan, 1985, presents to the clinic for a blood pressure check    Reason for walk in check: Provider Ordered      Current Outpatient Medications:   •  albuterol sulfate  (90 Base) MCG/ACT inhaler, Inhale 2 puffs Every 4 (Four) Hours As Needed for Wheezing or Shortness of Air., Disp: 18 g, Rfl: 3  •  amLODIPine (NORVASC) 10 MG tablet, Take 1 tablet by mouth Daily for 90 days., Disp: 90 tablet, Rfl: 1  •  Arazlo 0.045 % lotion, , Disp: , Rfl:   •  atorvastatin (LIPITOR) 40 MG tablet, Take 1 tablet by mouth Daily for 90 days., Disp: 90 tablet, Rfl: 1  •  losartan (Cozaar) 100 MG tablet, Take 1 tablet by mouth Daily., Disp: 30 tablet, Rfl: 1      Blood Pressure Reading Today: 140/24 left    Symptoms: None    Previous Readings:   BP Readings from Last 3 Encounters:   12/02/22 130/72   11/18/22 146/84   11/03/22 148/88       Provider Consulted: PAM Sherwood    Follow-Up Plan: Check BP on both arms, chart and bring back to next appt 1-6-23.       Christopher Hickman RN  12/16/22, 10:52 EST

## 2023-01-10 ENCOUNTER — OFFICE VISIT (OUTPATIENT)
Dept: INTERNAL MEDICINE | Facility: CLINIC | Age: 38
End: 2023-01-10
Payer: COMMERCIAL

## 2023-01-10 VITALS
HEART RATE: 88 BPM | BODY MASS INDEX: 45.99 KG/M2 | HEIGHT: 67 IN | SYSTOLIC BLOOD PRESSURE: 138 MMHG | RESPIRATION RATE: 15 BRPM | WEIGHT: 293 LBS | TEMPERATURE: 96.3 F | DIASTOLIC BLOOD PRESSURE: 78 MMHG | OXYGEN SATURATION: 96 %

## 2023-01-10 DIAGNOSIS — G93.2 IIH (IDIOPATHIC INTRACRANIAL HYPERTENSION): ICD-10-CM

## 2023-01-10 DIAGNOSIS — I10 ESSENTIAL HYPERTENSION: Primary | ICD-10-CM

## 2023-01-10 DIAGNOSIS — E55.9 VITAMIN D DEFICIENCY: ICD-10-CM

## 2023-01-10 DIAGNOSIS — E78.5 HYPERLIPIDEMIA, UNSPECIFIED HYPERLIPIDEMIA TYPE: ICD-10-CM

## 2023-01-10 PROCEDURE — 99214 OFFICE O/P EST MOD 30 MIN: CPT | Performed by: NURSE PRACTITIONER

## 2023-01-10 RX ORDER — ACETAZOLAMIDE 250 MG/1
250 TABLET ORAL ONCE
COMMUNITY
Start: 2022-12-30

## 2023-01-10 NOTE — ASSESSMENT & PLAN NOTE
Mild elevation in clinic today but patient with significant improvement in home readings.  Will continue current dosages of losartan and amlodipine.  She should continue to monitor blood pressure closely and call with consistent elevations greater than 130/80.  Routine labs today.

## 2023-01-10 NOTE — PROGRESS NOTES
Chief Complaint  Follow-up (Essential hypertension) and Hypertension    Subjective         Leonarda Hogan presents to [facility]  HTN-  Managed with amlodipine and losartan. Patient reports she has noticed an improvement in the way that she feels since increasing losartan dosage.  Reports home readings have been averaging 110s-120s/70s. Denies chest pain, blurry vision, headache, leg swelling. Swelling is going down in her eyes, eye drops have been decreased to once day. Goal is to discontinue drops in the future.  Has follow-up next week with ophthalmology.  Patient is also scheduled for sleep study in March.    Vitamin D deficiency-  Managed with over-the-counter oral supplement.     HLD-  Managed with statin. Well tolerated, denies leg cramping.  Most recent LDL 72.       Objective     Vitals:    01/10/23 1602   BP: 138/78   BP Location: Right arm   Patient Position: Sitting   Cuff Size: Large Adult   Pulse: 88   Resp: 15   Temp: 96.3 °F (35.7 °C)   SpO2: 96%   Weight: (!) 158 kg (349 lb)   Height: 170.2 cm (67\")      Body mass index is 54.66 kg/m².    Wt Readings from Last 3 Encounters:   01/10/23 (!) 158 kg (349 lb)   12/02/22 (!) 157 kg (345 lb 12.8 oz)   11/18/22 (!) 155 kg (341 lb)     BP Readings from Last 3 Encounters:   01/10/23 138/78   12/02/22 130/72   11/18/22 146/84                Physical Exam  Constitutional:       Appearance: Normal appearance.   HENT:      Head: Normocephalic and atraumatic.      Nose: Nose normal.      Mouth/Throat:      Mouth: Mucous membranes are moist.      Pharynx: Oropharynx is clear.   Eyes:      Extraocular Movements: Extraocular movements intact.      Conjunctiva/sclera: Conjunctivae normal.      Pupils: Pupils are equal, round, and reactive to light.   Neck:      Thyroid: No thyroid mass, thyromegaly or thyroid tenderness.   Cardiovascular:      Rate and Rhythm: Normal rate and regular rhythm.      Heart sounds: Normal  heart sounds.   Pulmonary:      Effort: Pulmonary effort is normal.      Breath sounds: Normal breath sounds.   Skin:     General: Skin is warm and dry.   Neurological:      General: No focal deficit present.      Mental Status: She is alert and oriented to person, place, and time.   Psychiatric:         Mood and Affect: Mood normal.         Behavior: Behavior normal.         Thought Content: Thought content normal.          Result Review :   The following data was reviewed by: PAM Sherwood on 01/10/2023:      Procedures    Assessment and Plan   Diagnoses and all orders for this visit:    1. Essential hypertension (Primary)  Assessment & Plan:  Mild elevation in clinic today but patient with significant improvement in home readings.  Will continue current dosages of losartan and amlodipine.  She should continue to monitor blood pressure closely and call with consistent elevations greater than 130/80.  Routine labs today.    Orders:  -     Comprehensive Metabolic Panel  -     CBC & Differential  -     TSH    2. Vitamin D deficiency  Assessment & Plan:  Continue vitamin D supplement, vitamin D level with labs today.       Orders:  -     Vitamin D,25-Hydroxy    3. Hyperlipidemia, unspecified hyperlipidemia type  Assessment & Plan:  Well controlled, continue statin. Most recent LDL 72. Lipid panel with labs.      Orders:  -     Lipid Panel    4. IIH (idiopathic intracranial hypertension)  Assessment & Plan:  Continue Diamox, follow-up with ophthalmology as scheduled.          Follow Up   Return in about 6 months (around 7/10/2023) for Annual physical.  Patient was given instructions and counseling regarding her condition or for health maintenance advice. Please see specific information pulled into the AVS if appropriate.

## 2023-01-11 ENCOUNTER — CLINICAL SUPPORT (OUTPATIENT)
Dept: INTERNAL MEDICINE | Facility: CLINIC | Age: 38
End: 2023-01-11
Payer: COMMERCIAL

## 2023-01-11 DIAGNOSIS — I10 ESSENTIAL HYPERTENSION: ICD-10-CM

## 2023-01-11 DIAGNOSIS — E78.5 HYPERLIPIDEMIA, UNSPECIFIED HYPERLIPIDEMIA TYPE: ICD-10-CM

## 2023-01-11 DIAGNOSIS — E55.9 VITAMIN D DEFICIENCY: ICD-10-CM

## 2023-01-11 DIAGNOSIS — Z00.00 ANNUAL PHYSICAL EXAM: ICD-10-CM

## 2023-01-11 DIAGNOSIS — G93.2 IIH (IDIOPATHIC INTRACRANIAL HYPERTENSION): ICD-10-CM

## 2023-01-11 LAB
25(OH)D3 SERPL-MCNC: 31.7 NG/ML (ref 30–100)
ALBUMIN SERPL-MCNC: 4.1 G/DL (ref 3.5–5.2)
ALBUMIN/GLOB SERPL: 1.2 G/DL
ALP SERPL-CCNC: 71 U/L (ref 39–117)
ALT SERPL W P-5'-P-CCNC: 12 U/L (ref 1–33)
ANION GAP SERPL CALCULATED.3IONS-SCNC: 7.9 MMOL/L (ref 5–15)
AST SERPL-CCNC: 14 U/L (ref 1–32)
BASOPHILS # BLD AUTO: 0.02 10*3/MM3 (ref 0–0.2)
BASOPHILS NFR BLD AUTO: 0.3 % (ref 0–1.5)
BILIRUB SERPL-MCNC: 0.6 MG/DL (ref 0–1.2)
BUN SERPL-MCNC: 7 MG/DL (ref 6–20)
BUN/CREAT SERPL: 9.3 (ref 7–25)
CALCIUM SPEC-SCNC: 9.3 MG/DL (ref 8.6–10.5)
CHLORIDE SERPL-SCNC: 104 MMOL/L (ref 98–107)
CHOLEST SERPL-MCNC: 149 MG/DL (ref 0–200)
CO2 SERPL-SCNC: 25.1 MMOL/L (ref 22–29)
CREAT SERPL-MCNC: 0.75 MG/DL (ref 0.57–1)
DEPRECATED RDW RBC AUTO: 41.9 FL (ref 37–54)
EGFRCR SERPLBLD CKD-EPI 2021: 105.3 ML/MIN/1.73
EOSINOPHIL # BLD AUTO: 0.2 10*3/MM3 (ref 0–0.4)
EOSINOPHIL NFR BLD AUTO: 3.4 % (ref 0.3–6.2)
ERYTHROCYTE [DISTWIDTH] IN BLOOD BY AUTOMATED COUNT: 14.7 % (ref 12.3–15.4)
GLOBULIN UR ELPH-MCNC: 3.4 GM/DL
GLUCOSE SERPL-MCNC: 101 MG/DL (ref 65–99)
HCT VFR BLD AUTO: 38.8 % (ref 34–46.6)
HDLC SERPL-MCNC: 67 MG/DL (ref 40–60)
HGB BLD-MCNC: 12.7 G/DL (ref 12–15.9)
IMM GRANULOCYTES # BLD AUTO: 0.02 10*3/MM3 (ref 0–0.05)
IMM GRANULOCYTES NFR BLD AUTO: 0.3 % (ref 0–0.5)
LDLC SERPL CALC-MCNC: 69 MG/DL (ref 0–100)
LDLC/HDLC SERPL: 1.03 {RATIO}
LYMPHOCYTES # BLD AUTO: 2.62 10*3/MM3 (ref 0.7–3.1)
LYMPHOCYTES NFR BLD AUTO: 44.3 % (ref 19.6–45.3)
MCH RBC QN AUTO: 25.9 PG (ref 26.6–33)
MCHC RBC AUTO-ENTMCNC: 32.7 G/DL (ref 31.5–35.7)
MCV RBC AUTO: 79 FL (ref 79–97)
MONOCYTES # BLD AUTO: 0.35 10*3/MM3 (ref 0.1–0.9)
MONOCYTES NFR BLD AUTO: 5.9 % (ref 5–12)
NEUTROPHILS NFR BLD AUTO: 2.7 10*3/MM3 (ref 1.7–7)
NEUTROPHILS NFR BLD AUTO: 45.8 % (ref 42.7–76)
NRBC BLD AUTO-RTO: 0.2 /100 WBC (ref 0–0.2)
PLATELET # BLD AUTO: 351 10*3/MM3 (ref 140–450)
PMV BLD AUTO: 10.9 FL (ref 6–12)
POTASSIUM SERPL-SCNC: 4 MMOL/L (ref 3.5–5.2)
PROT SERPL-MCNC: 7.5 G/DL (ref 6–8.5)
RBC # BLD AUTO: 4.91 10*6/MM3 (ref 3.77–5.28)
SODIUM SERPL-SCNC: 137 MMOL/L (ref 136–145)
TRIGL SERPL-MCNC: 66 MG/DL (ref 0–150)
TSH SERPL DL<=0.05 MIU/L-ACNC: 0.99 UIU/ML (ref 0.27–4.2)
VLDLC SERPL-MCNC: 13 MG/DL (ref 5–40)
WBC NRBC COR # BLD: 5.91 10*3/MM3 (ref 3.4–10.8)

## 2023-01-11 PROCEDURE — 80061 LIPID PANEL: CPT | Performed by: NURSE PRACTITIONER

## 2023-01-11 PROCEDURE — 80050 GENERAL HEALTH PANEL: CPT | Performed by: NURSE PRACTITIONER

## 2023-01-11 PROCEDURE — 82306 VITAMIN D 25 HYDROXY: CPT | Performed by: NURSE PRACTITIONER

## 2023-01-11 PROCEDURE — 36415 COLL VENOUS BLD VENIPUNCTURE: CPT | Performed by: NURSE PRACTITIONER

## 2023-01-13 DIAGNOSIS — R73.09 ELEVATED GLUCOSE: Primary | ICD-10-CM

## 2023-02-17 RX ORDER — LOSARTAN POTASSIUM 100 MG/1
TABLET ORAL
Qty: 30 TABLET | Refills: 0 | Status: SHIPPED | OUTPATIENT
Start: 2023-02-17 | End: 2023-02-20 | Stop reason: SDUPTHER

## 2023-02-20 ENCOUNTER — OFFICE VISIT (OUTPATIENT)
Dept: INTERNAL MEDICINE | Facility: CLINIC | Age: 38
End: 2023-02-20
Payer: COMMERCIAL

## 2023-02-20 VITALS
WEIGHT: 293 LBS | RESPIRATION RATE: 18 BRPM | HEART RATE: 84 BPM | SYSTOLIC BLOOD PRESSURE: 118 MMHG | BODY MASS INDEX: 54.35 KG/M2 | TEMPERATURE: 97.8 F | OXYGEN SATURATION: 100 % | DIASTOLIC BLOOD PRESSURE: 78 MMHG

## 2023-02-20 DIAGNOSIS — R73.09 ELEVATED GLUCOSE: ICD-10-CM

## 2023-02-20 DIAGNOSIS — I10 ESSENTIAL HYPERTENSION: Primary | ICD-10-CM

## 2023-02-20 DIAGNOSIS — E66.01 MORBID OBESITY WITH BMI OF 50.0-59.9, ADULT: ICD-10-CM

## 2023-02-20 DIAGNOSIS — R73.01 IMPAIRED FASTING GLUCOSE: ICD-10-CM

## 2023-02-20 LAB — HBA1C MFR BLD: 6.5 % (ref 4.8–5.6)

## 2023-02-20 PROCEDURE — 83036 HEMOGLOBIN GLYCOSYLATED A1C: CPT | Performed by: NURSE PRACTITIONER

## 2023-02-20 PROCEDURE — 99214 OFFICE O/P EST MOD 30 MIN: CPT | Performed by: NURSE PRACTITIONER

## 2023-02-20 RX ORDER — CLINDAMYCIN PHOSPHATE 10 UG/ML
LOTION TOPICAL
COMMUNITY
Start: 2023-02-09

## 2023-02-20 RX ORDER — TRIAMCINOLONE ACETONIDE 1 MG/G
CREAM TOPICAL AS NEEDED
COMMUNITY
Start: 2023-02-09

## 2023-02-20 RX ORDER — ATORVASTATIN CALCIUM 40 MG/1
40 TABLET, FILM COATED ORAL DAILY
COMMUNITY

## 2023-02-20 RX ORDER — AMLODIPINE BESYLATE 5 MG/1
5 TABLET ORAL DAILY
Qty: 90 TABLET | Refills: 0 | Status: SHIPPED | OUTPATIENT
Start: 2023-02-20

## 2023-02-20 RX ORDER — LOSARTAN POTASSIUM 100 MG/1
100 TABLET ORAL DAILY
Qty: 90 TABLET | Refills: 0 | Status: SHIPPED | OUTPATIENT
Start: 2023-02-20

## 2023-02-20 RX ORDER — SEMAGLUTIDE 0.5 MG/.5ML
0.5 INJECTION, SOLUTION SUBCUTANEOUS WEEKLY
Qty: 2 ML | Refills: 0 | Status: SHIPPED | OUTPATIENT
Start: 2023-02-20 | End: 2023-04-03

## 2023-02-20 RX ORDER — DOCUSATE CALCIUM 240 MG
240 CAPSULE ORAL 2 TIMES DAILY
Qty: 28 CAPSULE | Refills: 0 | Status: SHIPPED | OUTPATIENT
Start: 2023-02-20

## 2023-02-20 NOTE — PROGRESS NOTES
Chief Complaint  blood in stool (Regular stool with blood in stool, noticed it Thursday night, no fever sometimes abdominal pain just finished cycle )    Subjective        Leonarda Hogan presents to [facility] for History of Present Illness  Concerns for blood in the stool, follow-up for hypertension, elevated glucose, and weight.    Patient reports she has noticed over the last week blood in her stool, noticed only when having a bowel movement, seeing some bright red blood in the toilet as well as when she wipes on the toilet paper.  No significant pain, no itching.    Following up for blood pressure, needs refills, was unsure if she needed to continue amlodipine or not.  Has been doing well since adding the losartan.    Concerned about elevated glucose, feels like she needed to have lab work done after last visit, wanted to ask about that today.    Patient also concerned about her weight, stating she has been dieting and exercising without any significant results, feeling frustrated.       Objective   Vital Signs:   /78 (BP Location: Left arm, Patient Position: Sitting, Cuff Size: Large Adult)   Pulse 84   Temp 97.8 °F (36.6 °C) (Temporal)   Resp 18   Wt (!) 157 kg (347 lb)   SpO2 100%   BMI 54.35 kg/m²     Physical Exam  Vitals and nursing note reviewed.   Constitutional:       Appearance: Normal appearance. She is obese.   HENT:      Head: Normocephalic and atraumatic.      Right Ear: External ear normal.      Left Ear: External ear normal.   Cardiovascular:      Rate and Rhythm: Normal rate.   Pulmonary:      Effort: Pulmonary effort is normal.   Neurological:      Mental Status: She is alert.   Psychiatric:         Mood and Affect: Mood normal.         Thought Content: Thought content normal.        Result Review :  {The following data was reviewed by PAM Mtz on 02/20/23                Diagnoses and all orders for this visit:    1. Essential hypertension  (Primary)    2. Elevated glucose  -     Hemoglobin A1c    3. Impaired fasting glucose    4. Morbid obesity with BMI of 50.0-59.9, adult (HCC)    Other orders  -     amLODIPine (NORVASC) 5 MG tablet; Take 1 tablet by mouth Daily.  Dispense: 90 tablet; Refill: 0  -     losartan (COZAAR) 100 MG tablet; Take 1 tablet by mouth Daily.  Dispense: 90 tablet; Refill: 0  -     phenylephrine-shark liver oil-mineral oil-petrolatum (PREPARATION H) 0.25-3-14-71.9 % rectal ointment; Insert  into the rectum 2 (Two) Times a Day As Needed for Hemorrhoids.  Dispense: 57 g; Refill: 0  -     docusate calcium (SURFAK) 240 MG capsule; Take 1 capsule by mouth 2 (Two) Times a Day.  Dispense: 28 capsule; Refill: 0  -     Semaglutide-Weight Management (Wegovy) 0.5 MG/0.5ML solution auto-injector; Inject 0.5 mL under the skin into the appropriate area as directed 1 (One) Time Per Week.  Dispense: 2 mL; Refill: 0    Encourage patient to continue current regimen with blood pressure medication, losartan and amlodipine.  We will plan to follow-up in 3 months with PCP.  Monitoring for symptoms, follow-up sooner if needed.    We will check A1c, it was ordered previously, will screen for diabetes.      We discussed weight today at length, we encouraged to continue exercise and diet changes, and decided to start Wegovy, which we discussed in depth.  Samples given today, we did use practice pen in office, and reviewed materials today.  Patient will start the 0.25 mg dose this Sunday, will go ahead and send in the 0.5 mg dose to start in 4 weeks, we will work on prior authorization in the meantime.  If she has any significant side effects encouraged to call office.  If she does well with current dose, she can start the 0.5 mg dose in 4 weeks, and follow-up at the end of that 4-week timeframe.      Follow Up   Return in about 8 weeks (around 4/17/2023) for Recheck/weight loss.  Patient was given instructions and counseling regarding her condition or for  health maintenance advice. Please see specific information pulled into the AVS if appropriate.     David Whalen, APRN  2/20/2023  This note was electronically signed.

## 2023-02-22 ENCOUNTER — PRIOR AUTHORIZATION (OUTPATIENT)
Dept: INTERNAL MEDICINE | Facility: CLINIC | Age: 38
End: 2023-02-22
Payer: COMMERCIAL

## 2023-02-22 NOTE — TELEPHONE ENCOUNTER
The Prior Authorization request has been approved for Wegovy 0.5MG/0.5ML SC SOAJ.  The authorization is valid from 01/23/2023 through 08/21/2023. A letter of explanation will also be  mailed to the patient.

## 2023-03-16 ENCOUNTER — OFFICE VISIT (OUTPATIENT)
Dept: SLEEP MEDICINE | Facility: HOSPITAL | Age: 38
End: 2023-03-16
Payer: COMMERCIAL

## 2023-03-16 VITALS
DIASTOLIC BLOOD PRESSURE: 82 MMHG | HEART RATE: 81 BPM | BODY MASS INDEX: 45.99 KG/M2 | SYSTOLIC BLOOD PRESSURE: 148 MMHG | WEIGHT: 293 LBS | HEIGHT: 67 IN | OXYGEN SATURATION: 100 %

## 2023-03-16 DIAGNOSIS — G47.10 HYPERSOMNIA: Primary | ICD-10-CM

## 2023-03-16 PROCEDURE — G0463 HOSPITAL OUTPT CLINIC VISIT: HCPCS

## 2023-03-16 NOTE — PROGRESS NOTES
CONSULT NOTE    Patient Identification:  Leonarda Hogan  37 y.o.  female  1985  [id number]            Requesting physician: Saira Saucedo    Reason for Consultation: Hypersomnia    CC:     History of Present Illness:  Very pleasant 37-year-old female reports heavy snoring progressively getting worse.  No clear history of witnessed apnea.  She tells me she is able to sleep throughout the night.  Generally feels somewhat rested in the morning and denies sleepiness in the afternoon.  No heavy alcohol use no parasomnia such as sleepwalking sleep talking or restless leg symptoms reported.  Goes to bed 8 gets up 10 gets about 5 AM gets about 7+ hours of sleep and feels somewhat okay in the morning weight gain of 30 pounds reported.  Also reports grinding her teeth at night.      Review of Systems  12 point review of system documented on the sleep questionnaire completely negative  [place] 3 out of 24 within normal limits  Past Medical History:  Past Medical History:   Diagnosis Date   • Acne    • Allergic    • Asthma    • Hyperlipidemia    • Hypertension    • Kidney stones    • Sinus trouble        Past Surgical History:  Past Surgical History:   Procedure Laterality Date   • TONSILLECTOMY  2006   • WISDOM TOOTH EXTRACTION  2007   • WISDOM TOOTH EXTRACTION  2007        Home Meds:  (Not in a hospital admission)      Allergies:  No Known Allergies    Social History:   Social History     Socioeconomic History   • Marital status: Single   Tobacco Use   • Smoking status: Never   • Smokeless tobacco: Never   Vaping Use   • Vaping Use: Never used   Substance and Sexual Activity   • Alcohol use: Not Currently   • Drug use: Never   • Sexual activity: Yes     Partners: Male     Birth control/protection: Condom       Family History:  Family History   Problem Relation Age of Onset   • Heart disease Father    • Sleep apnea Father    • Diabetes Sister    • Sleep apnea Sister    • Breast cancer Sister    • Heart disease Paternal  "Grandmother        Physical Exam:  /82   Pulse 81   Ht 170.2 cm (67\")   Wt (!) 154 kg (339 lb 1.6 oz)   SpO2 100%   BMI 53.11 kg/m²  Body mass index is 53.11 kg/m². 100% (!) 154 kg (339 lb 1.6 oz)  Physical Exam  Patient is examined using the personal protective equipment as per guidelines from infection control for this particular patient as enacted.  Hand hygiene was performed before and after patient interaction.  Well-developed normal body habitus  Eyes normal conjunctivae and pupils reactive to light  ENT Mallampati between 3 and 4 normal nasal exam  Neck midline trachea no thyromegaly  Chest no labored breathing clear  CVS regular rate and rhythm no lower extremity edema  Abdomen soft nontender no hepatosplenomegaly  CNS intact normal sensory exam  Skin no rashes no nodules  Psych oriented to time place and person normal memory  Musculoskeletal no cyanosis no clubbing normal range of motion        LABS:  Lab Results   Component Value Date    CALCIUM 9.3 01/11/2023       No results found for: CKTOTAL, CKMB, CKMBINDEX, TROPONINI, TROPONINT                              Lab Results   Component Value Date    TSH 0.991 01/11/2023     CrCl cannot be calculated (Patient's most recent lab result is older than the maximum 30 days allowed.).         Imaging: I personally visualized the images of scans/x-rays performed within last 3 days.      Assessment:  Hypersomnia snoring  Asthma  Hypertension  Morbid obesity      Recommendations:  At this time we have a young female with obesity worse snoring airway anatomy all consistent with possible sleep apnea syndrome  Discussed pathophysiology consequence of untreated sleep apnea.  Patient agreeable wishing to proceed for a home sleep study.  Sleep hygiene measures discussed in detail  Weight loss encouraged  Treatment of underlying comorbidities  We will follow-up after sleep study to make further recommendations              Rubin Caicedo MD  3/16/2023  14:31 " EDT      Much of this encounter note is an electronic transcription/translation of spoken language to printed text using Dragon Software.

## 2023-04-03 ENCOUNTER — OFFICE VISIT (OUTPATIENT)
Dept: INTERNAL MEDICINE | Facility: CLINIC | Age: 38
End: 2023-04-03
Payer: COMMERCIAL

## 2023-04-03 VITALS
BODY MASS INDEX: 51.72 KG/M2 | RESPIRATION RATE: 18 BRPM | WEIGHT: 293 LBS | OXYGEN SATURATION: 100 % | SYSTOLIC BLOOD PRESSURE: 118 MMHG | TEMPERATURE: 97.7 F | HEART RATE: 85 BPM | DIASTOLIC BLOOD PRESSURE: 80 MMHG

## 2023-04-03 DIAGNOSIS — E66.01 CLASS 3 SEVERE OBESITY DUE TO EXCESS CALORIES WITH SERIOUS COMORBIDITY AND BODY MASS INDEX (BMI) OF 50.0 TO 59.9 IN ADULT: ICD-10-CM

## 2023-04-03 DIAGNOSIS — E78.5 HYPERLIPIDEMIA, UNSPECIFIED HYPERLIPIDEMIA TYPE: ICD-10-CM

## 2023-04-03 DIAGNOSIS — I10 ESSENTIAL HYPERTENSION: Primary | ICD-10-CM

## 2023-04-03 RX ORDER — SEMAGLUTIDE 1 MG/.5ML
1 INJECTION, SOLUTION SUBCUTANEOUS WEEKLY
Qty: 2 ML | Refills: 0 | Status: SHIPPED | OUTPATIENT
Start: 2023-04-03

## 2023-04-03 RX ORDER — DOXYCYCLINE 100 MG/1
100 CAPSULE ORAL DAILY
COMMUNITY
Start: 2023-03-24

## 2023-04-03 NOTE — PROGRESS NOTES
Chief Complaint  Hypertension (Follow up )    Subjective        Leonarda Hogan presents to [facility] for History of Present Illness  Hypertension and obesity follow-up.    Patient was started on Wegovy 8 weeks ago, patient has done well thus far on medication.  Down 19 pounds.  Feeling very good today.  No significant concerns or complaints today.  Blood pressure within normal limits today.       Objective   Vital Signs:   /80 (BP Location: Left arm, Patient Position: Sitting, Cuff Size: Large Adult)   Pulse 85   Temp 97.7 °F (36.5 °C) (Temporal)   Resp 18   Wt (!) 150 kg (330 lb 3.2 oz)   SpO2 100%   BMI 51.72 kg/m²     Physical Exam  Vitals and nursing note reviewed.   Constitutional:       Appearance: Normal appearance. She is obese.   HENT:      Head: Normocephalic and atraumatic.   Pulmonary:      Effort: Pulmonary effort is normal.   Neurological:      Mental Status: She is alert.   Psychiatric:         Mood and Affect: Mood normal.         Thought Content: Thought content normal.        Result Review :  {The following data was reviewed by PAM Mtz on 04/03/23                Diagnoses and all orders for this visit:    1. Essential hypertension (Primary)    2. Hyperlipidemia, unspecified hyperlipidemia type    3. Class 3 severe obesity due to excess calories with serious comorbidity and body mass index (BMI) of 50.0 to 59.9 in adult    Other orders  -     Semaglutide-Weight Management (Wegovy) 1 MG/0.5ML solution auto-injector; Inject 1 mg under the skin into the appropriate area as directed 1 (One) Time Per Week.  Dispense: 2 mL; Refill: 0    Patient is tolerating medication well, no significant issues at this point in time.  Continue with the 0.5 dose for 2 more weeks, new dose has been sent in.  Discussed side effects again, follow-up as needed.  If tolerating well after 4 weeks at the 1 mg dose, she can call office and we can send in the next dose.   Follow-up with her PCP in June as already scheduled      Follow Up   No follow-ups on file.  Patient was given instructions and counseling regarding her condition or for health maintenance advice. Please see specific information pulled into the AVS if appropriate.     David Whalen, APRN  4/3/2023  This note was electronically signed.

## 2023-04-14 ENCOUNTER — HOSPITAL ENCOUNTER (OUTPATIENT)
Dept: SLEEP MEDICINE | Facility: HOSPITAL | Age: 38
Discharge: HOME | End: 2023-04-14
Admitting: INTERNAL MEDICINE
Payer: COMMERCIAL

## 2023-04-14 DIAGNOSIS — G47.10 HYPERSOMNIA: ICD-10-CM

## 2023-04-14 PROCEDURE — 95806 SLEEP STUDY UNATT&RESP EFFT: CPT

## 2023-04-24 ENCOUNTER — TELEPHONE (OUTPATIENT)
Dept: SLEEP MEDICINE | Facility: HOSPITAL | Age: 38
End: 2023-04-24
Payer: COMMERCIAL

## 2023-05-08 ENCOUNTER — OFFICE VISIT (OUTPATIENT)
Dept: INTERNAL MEDICINE | Facility: CLINIC | Age: 38
End: 2023-05-08
Payer: COMMERCIAL

## 2023-05-08 VITALS
WEIGHT: 293 LBS | TEMPERATURE: 97.2 F | HEART RATE: 73 BPM | OXYGEN SATURATION: 99 % | DIASTOLIC BLOOD PRESSURE: 78 MMHG | SYSTOLIC BLOOD PRESSURE: 124 MMHG | BODY MASS INDEX: 52.41 KG/M2

## 2023-05-08 DIAGNOSIS — E66.01 CLASS 3 SEVERE OBESITY DUE TO EXCESS CALORIES WITH SERIOUS COMORBIDITY AND BODY MASS INDEX (BMI) OF 50.0 TO 59.9 IN ADULT: ICD-10-CM

## 2023-05-08 DIAGNOSIS — M54.50 ACUTE RIGHT-SIDED LOW BACK PAIN WITHOUT SCIATICA: Primary | ICD-10-CM

## 2023-05-08 LAB
BILIRUB UR QL STRIP: NEGATIVE
CLARITY UR: CLEAR
COLOR UR: YELLOW
GLUCOSE UR STRIP-MCNC: NEGATIVE MG/DL
HGB UR QL STRIP.AUTO: NEGATIVE
KETONES UR QL STRIP: NEGATIVE
LEUKOCYTE ESTERASE UR QL STRIP.AUTO: NEGATIVE
NITRITE UR QL STRIP: NEGATIVE
PH UR STRIP.AUTO: 5.5 [PH] (ref 5–8)
PROT UR QL STRIP: NEGATIVE
SP GR UR STRIP: 1.02 (ref 1–1.03)
UROBILINOGEN UR QL STRIP: NORMAL

## 2023-05-08 PROCEDURE — 99213 OFFICE O/P EST LOW 20 MIN: CPT

## 2023-05-08 RX ORDER — SEMAGLUTIDE 1.7 MG/.75ML
1.7 INJECTION, SOLUTION SUBCUTANEOUS WEEKLY
Qty: 0.75 ML | Refills: 3 | Status: SHIPPED | OUTPATIENT
Start: 2023-05-08

## 2023-05-08 NOTE — PROGRESS NOTES
Chief Complaint  Back Pain (Right side /Started about 2 weeks ago /)    Subjective      Leonarda Hogan presents to [facility]  History of Present Illness    Leonarda is here for right lower sided back pain.   She states it started about two weeks ago.   She did take a flight to [place] and had a back pack on so she doesn't know if that flared it up  Pain is right lower back side   Not on menstrual cycle  Able to get comfortable at night  Tylenol helped the pain  Not radiating down her leg, or up her back  Not radiating to her hip  No abdominal pain or left sided pain    She did have a kidney stone in 2021   Denies any fever or nausea       Current Outpatient Medications   Medication Instructions   • acetaZOLAMIDE (DIAMOX) 250 mg, Oral, Once   • albuterol sulfate  (90 Base) MCG/ACT inhaler 2 puffs, Inhalation, Every 4 Hours PRN   • amLODIPine (NORVASC) 5 mg, Oral, Daily   • Arazlo 0.045 % lotion No dose, route, or frequency recorded.   • atorvastatin (LIPITOR) 40 mg, Oral, Daily   • clindamycin (CLEOCIN T) 1 % lotion APPLY LOTION TOPICALLY TO FACE ONCE DAILY AS NEEDED   • docusate calcium (SURFAK) 240 mg, Oral, 2 Times Daily   • doxycycline (MONODOX) 100 mg, Oral, Daily   • losartan (COZAAR) 100 mg, Oral, Daily   • phenylephrine-shark liver oil-mineral oil-petrolatum (PREPARATION H) 0.25-3-14-71.9 % rectal ointment Rectal, 2 Times Daily PRN   • triamcinolone (KENALOG) 0.1 % cream As Needed.   • vitamin D3 5,000 Units, Oral, Daily   • Wegovy 1.7 mg, Subcutaneous, Weekly       The following portions of the patient's history were reviewed and updated as appropriate: allergies, current medications, past family history, past medical history, past social history, past surgical history, and problem list.    Objective   Vital Signs:   /78   Pulse 73   Temp 97.2 °F (36.2 °C) (Temporal)   Wt (!) 152 kg (334 lb 9.6 oz)   SpO2 99%   BMI 52.41 kg/m²     Wt  Chief Complaint:  Chief Complaint   Patient presents with   • Ear Problem       HISTORY OF PRESENT ILLNESS:  Karthik is a 64 year old male who presents for the above concerns. Here w/ family member who gives part of hx.    Bilateral ear pressure and ringing at times.  Worse on the right but is present inga.  This been going on for quite some time but has been worse lately.  He was seen 3 days ago and his right ear with flushed out.  He was told he has eustachian tube dysfunction and recommended to start flonase and claritin and rec f/u w/ PCP.    No fevers.  He does not use anything in his ears.  Not wear hearing aids or wear any sort of ear plugs or headphones frequently.  No URI sx. Otherwise feels well.    Of note he does have a hx of a squamous cell cancer of his tongue.  Had radiation in the past.  He states he thought both the feet Dr. Tucker be about once a year.  He last saw him summer 2021 and was advised to f/u in 6 months.  He has not seen him since.      PCP: Nicholas Morales MD    REVIEW OF SYSTEMS:  Full ROS reviewed and are negative except as noted above.  PMH, PFH, Soc Hx, Allergies reviewed today.    OBJECTIVE:  Visit Vitals  BP (!) 155/83   Pulse 85   Temp 97.6 °F (36.4 °C) (Temporal)   Resp 18   Wt 92.5 kg (203 lb 14.8 oz)   SpO2 97%   BMI 29.26 kg/m²        General:  NAD, appears stated age, well groomed  HEENT:  Mask in place, TM clear and flat inga w/ small effusion on the R, ext aud canals wnl, no pain w/ pull on pinnae, OP clear, mucous membranes fairly dry (this is his baseline), no pharyngeal erythema, no tonsillar hypertrophy or exudate, uvula midline, no PND visualized, no supraclavicular or cervical LAD  CV/Pulm: CTAB, No increased WOB on RA, regular rate, non-displaced PMI  Derm/MSK:  No swelling/edema, normal gait independently, no rash  Psych:  Cooperative, appropriate mood/affect, answers questions appropriately  -Rev last progress note, rev last ENT note    ASSESSMENT/PLAN:  1. Tinnitus of both  Readings from Last 3 Encounters:   05/08/23 (!) 152 kg (334 lb 9.6 oz)   04/03/23 (!) 150 kg (330 lb 3.2 oz)   03/16/23 (!) 154 kg (339 lb 1.6 oz)     BP Readings from Last 3 Encounters:   05/08/23 124/78   04/03/23 118/80   03/16/23 148/82     Physical Exam  Vitals reviewed.   Constitutional:       Appearance: Normal appearance. She is well-developed.   HENT:      Head: Normocephalic and atraumatic.      Mouth/Throat:      Pharynx: No oropharyngeal exudate.   Eyes:      Conjunctiva/sclera: Conjunctivae normal.      Pupils: Pupils are equal, round, and reactive to light.   Cardiovascular:      Rate and Rhythm: Normal rate and regular rhythm.      Heart sounds: No murmur heard.    No friction rub. No gallop.   Pulmonary:      Effort: Pulmonary effort is normal.      Breath sounds: Normal breath sounds. No wheezing or rhonchi.   Musculoskeletal:      Lumbar back: Spasms and tenderness present. Negative right straight leg raise test.        Back:    Skin:     General: Skin is warm and dry.   Neurological:      Mental Status: She is alert and oriented to person, place, and time.   Psychiatric:         Mood and Affect: Affect normal.          Result Review :  The following data was reviewed by: PAM Christensen on 05/08/2023:      Common labs        10/20/2022    07:00 1/11/2023    16:29 2/20/2023    14:00   Common Labs   Glucose  101      BUN  7      Creatinine  0.75      Sodium  137      Potassium  4.0      Chloride  104      Calcium  9.3      Albumin  4.1      Total Bilirubin  0.6      Alkaline Phosphatase  71      AST (SGOT)  14      ALT (SGPT)  12      WBC 4.85   5.91      Hemoglobin 12.0   12.7      Hematocrit 36.5   38.8      Platelets 303   351      Total Cholesterol  149      Triglycerides  66      HDL Cholesterol  67      LDL Cholesterol   69      Hemoglobin A1C   6.50         Lab Results (last 72 hours)     Procedure Component Value Units Date/Time    Urinalysis With Culture If Indicated - Urine, Clean  ears    2. Dysfunction of right eustachian tube    3. Squamous cell carcinoma of tongue (CMS/HCC)        Sent referral to audiology for his tinnitus.  Long over due for f/u w/ Dr. Hernandez, ENT. Will send a msg to staff to see if we can get him scheduled for this and his above sx.   Discussed supportive cares, gave a handout. Sent in rx for fluticasone INCS.     Pt and family member verbalizes understanding and agrees w/ plan. Gave strict return precautions. F/u w/ PCP, ENT   Catch [[id number]] Collected: 05/08/23 1601    Specimen: Urine, Clean Catch Updated: 05/08/23 1601           No Images in the past 120 days found..    Lab Results   Component Value Date    POCPREGUR Negative 10/18/2022    INR 0.93 10/20/2022       Procedures        Assessment and Plan   Diagnoses and all orders for this visit:    1. Acute right-sided low back pain without sciatica (Primary)  Comments:  Suspicious for muscle sprain; recommended tylenol, heat, rest, and monitoring for worsening. Discussed next work up if pain is worsening.  Orders:  -     Cancel: POCT urinalysis dipstick, automated  -     Urinalysis With Culture If Indicated - Urine, Clean Catch    2. Class 3 severe obesity due to excess calories with serious comorbidity and body mass index (BMI) of 50.0 to 59.9 in adult  Comments:  Next increased dose sent to pharmacy of Wegovy. Patient tolerating well.   Orders:  -     Semaglutide-Weight Management (Wegovy) 1.7 MG/0.75ML solution auto-injector; Inject 0.75 mL under the skin into the appropriate area as directed 1 (One) Time Per Week.  Dispense: 0.75 mL; Refill: 3    Discussed evaluating for blood in urine - negative. Discussed if symptoms worsen, or change could proceed with lumbar xrays or CT abdomen to evaluate for kidney stones. Patient to return to office if symptoms do not improve.       Medications Discontinued During This Encounter   Medication Reason   • Semaglutide-Weight Management (Wegovy) 1 MG/0.5ML solution auto-injector Dose adjustment          Follow Up   No follow-ups on file.  Patient was given instructions and counseling regarding her condition or for health maintenance advice. Please see specific information pulled into the AVS if appropriate.       Neelam Omalley, PAM  05/08/23  16:22 EDT

## 2023-07-27 ENCOUNTER — OFFICE VISIT (OUTPATIENT)
Dept: SLEEP MEDICINE | Facility: HOSPITAL | Age: 38
End: 2023-07-27
Payer: COMMERCIAL

## 2023-07-27 VITALS
BODY MASS INDEX: 45.99 KG/M2 | SYSTOLIC BLOOD PRESSURE: 141 MMHG | DIASTOLIC BLOOD PRESSURE: 82 MMHG | WEIGHT: 293 LBS | OXYGEN SATURATION: 98 % | HEART RATE: 72 BPM | HEIGHT: 67 IN

## 2023-07-27 DIAGNOSIS — G47.33 OSA (OBSTRUCTIVE SLEEP APNEA): Primary | ICD-10-CM

## 2023-07-27 PROCEDURE — G0463 HOSPITAL OUTPT CLINIC VISIT: HCPCS

## 2023-07-27 NOTE — PROGRESS NOTES
"[facility] PULMON[facility]ARE         Dr Rubin Caicedo  [unfilled]  Patient Care Team:  Saira Saucedo APRN as PCP - General (Nurse Practitioner)    Chief Complaint:Apnea index 11.6 events per hour consistent with mild TYLER  Lowest oxygen saturation 74%  Apnea index in the supine positioning 12.8 events per hour  Apnea index on the right side 0 events per hour     Mean heart rate 67 bpm     Patient snored 95% of sleep time     Oxygen summary  Oxygen desaturation below 89% for 22 minutes    Interval History: Patient here for follow-up post set up.  She has been set up on auto CPAP 5 to 15 cm.  Compliance with 80% average daily use 5 hours 41 minutes.  AHI leak look excellent.  Patient tells me she has been having hiccups post starting the CPAP machine.  They have been improving.  Her primary physician tried Reglan but she was intolerant to it.  Currently goes to bed 9 gets up 5 gets about 6+ hours of sleep and more rested with the CPAP.  No tobacco no alcohol no caffeine abuse.  Currently has a nasal mask that fits well.  Supplies been adequate.  [place] 4 out of 24 within normal limits.    REVIEW OF SYSTEMS:   CARDIOVASCULAR: No chest pain, chest pressure or chest discomfort. No palpitations or edema.   RESPIRATORY: No shortness of breath, cough or sputum.   GASTROINTESTINAL: No anorexia, nausea, vomiting or diarrhea. No abdominal pain or blood.   HEMATOLOGIC: No bleeding or bruising.     Ventilator/Non-Invasive Ventilation Settings (From admission, onward)      None              Vital Signs  Heart Rate:  [72] 72  BP: (141)/(82) 141/82  [unfilled]  Flowsheet Rows      Flowsheet Row First Filed Value   Admission Height 170.2 cm (67.01\") Documented at 07/27/2023 0900   Admission Weight 149 kg (327 lb 12.8 oz) Documented at 07/27/2023 0900            Physical Exam:  Patient is examined using the personal protective equipment as per guidelines from infection control for this particular patient as enacted.  Hand hygiene " was performed before and after patient interaction.   General Appearance:    Alert, cooperative, in no acute distress.  Following simple commands  ENT Mallampati between 3 and 4 no nasal congestion  Neck midline trachea, no thyromegaly   Lungs:     Clear to auscultation, respirations regular, even and                  unlabored    Heart:    Regular rhythm and normal rate, normal S1 and S2, no            murmur, no gallop, no rub, no click   Chest Wall:    No abnormalities observed   Abdomen:     Normal bowel sounds, no masses, no organomegaly, soft        nontender, nondistended, no guarding, no rebound                tenderness   Extremities:   Moves all extremities well, no edema, no cyanosis, no             redness  CNS no focal neurological deficits normal sensory exam  Skin no rashes no nodules  Musculoskeletal no cyanosis no clubbing normal range of motion     Results Review:                                          I reviewed the patient's new clinical results.  I personally viewed and interpreted the patient's chest x-ray.        Medication Review:       No current facility-administered medications for this visit.      ASSESSMENT:   Mild debbie  Hiccups  Asthma  Hypertension  Morbid obesity    PLAN:  I reviewed compliance download with the patient  Compliance AHI leak look excellent.  Her hiccups could be related to her CPAP due to increase swallowing of air related to CPAP treatment.  Options would be to lower the pressure but then we will be compromising her DEBBIE.  She tells me that her hiccups have been improving.  I would recommend some Gas-X and measures to decrease swelling of the air.  If hiccups persistent may have to lower her CPAP pressure  Sleep hygiene measures  Treatment of underlying comorbidities  Weight loss encouraged  We will see her back in 6 months and reevaluate.  If he Is worse she can see me sooner.          Rubin Caicedo MD  07/27/23  09:36 EDT

## 2023-07-31 ENCOUNTER — TELEPHONE (OUTPATIENT)
Dept: INTERNAL MEDICINE | Facility: CLINIC | Age: 38
End: 2023-07-31

## 2023-07-31 NOTE — TELEPHONE ENCOUNTER
Caller: Leonarda Hogan    Relationship: Self    Best call back number: [phone]     What is the best time to reach you: ANY    Who are you requesting to speak with (clinical staff, provider,  specific staff member): CLINICAL    What was the call regarding: PATIENT CALLED TO RESCHEDULE HER PHYSICAL AND STATED THAT HER PHYSICAL LAST YEAR WAS CODED INCORRECTLY. PATIENT ASKED IF WE CAN CHANGE THE 6.24.22 APPOINTMENT FROM AN OFFICE VISIT TO A PHYSICAL BECAUSE SHE HAD A PHYSICAL DONE THAT DAY?    Is it okay if the provider responds through MyChart: YES

## 2023-08-01 NOTE — TELEPHONE ENCOUNTER
It looks to me like it was coded as an annual physical so I am not sure that  there is anything different that could be done on my part.

## 2023-08-11 ENCOUNTER — OFFICE VISIT (OUTPATIENT)
Dept: INTERNAL MEDICINE | Facility: CLINIC | Age: 38
End: 2023-08-11
Payer: COMMERCIAL

## 2023-08-11 VITALS
RESPIRATION RATE: 18 BRPM | DIASTOLIC BLOOD PRESSURE: 76 MMHG | OXYGEN SATURATION: 99 % | SYSTOLIC BLOOD PRESSURE: 134 MMHG | TEMPERATURE: 97.8 F | HEIGHT: 67 IN | BODY MASS INDEX: 45.99 KG/M2 | WEIGHT: 293 LBS | HEART RATE: 70 BPM

## 2023-08-11 DIAGNOSIS — Z80.3 FAMILY HISTORY OF BREAST CANCER IN SISTER: ICD-10-CM

## 2023-08-11 DIAGNOSIS — Z00.00 ANNUAL PHYSICAL EXAM: Primary | ICD-10-CM

## 2023-08-11 DIAGNOSIS — G93.2 IIH (IDIOPATHIC INTRACRANIAL HYPERTENSION): ICD-10-CM

## 2023-08-11 DIAGNOSIS — E66.01 CLASS 3 SEVERE OBESITY DUE TO EXCESS CALORIES WITH SERIOUS COMORBIDITY AND BODY MASS INDEX (BMI) OF 50.0 TO 59.9 IN ADULT: ICD-10-CM

## 2023-08-11 DIAGNOSIS — Z13.9 SCREENING FOR CONDITION: ICD-10-CM

## 2023-08-11 DIAGNOSIS — I10 ESSENTIAL HYPERTENSION: ICD-10-CM

## 2023-08-11 DIAGNOSIS — L70.0 ACNE VULGARIS: ICD-10-CM

## 2023-08-11 DIAGNOSIS — E55.9 VITAMIN D DEFICIENCY: ICD-10-CM

## 2023-08-11 DIAGNOSIS — E78.5 HYPERLIPIDEMIA, UNSPECIFIED HYPERLIPIDEMIA TYPE: ICD-10-CM

## 2023-08-11 DIAGNOSIS — R73.01 IMPAIRED FASTING GLUCOSE: ICD-10-CM

## 2023-08-11 LAB
25(OH)D3 SERPL-MCNC: 26.4 NG/ML (ref 30–100)
ALBUMIN SERPL-MCNC: 4.3 G/DL (ref 3.5–5.2)
ALBUMIN/GLOB SERPL: 1.3 G/DL
ALP SERPL-CCNC: 70 U/L (ref 39–117)
ALT SERPL W P-5'-P-CCNC: 14 U/L (ref 1–33)
ANION GAP SERPL CALCULATED.3IONS-SCNC: 9 MMOL/L (ref 5–15)
AST SERPL-CCNC: 17 U/L (ref 1–32)
BASOPHILS # BLD AUTO: 0.02 10*3/MM3 (ref 0–0.2)
BASOPHILS NFR BLD AUTO: 0.4 % (ref 0–1.5)
BILIRUB SERPL-MCNC: 0.7 MG/DL (ref 0–1.2)
BUN SERPL-MCNC: 10 MG/DL (ref 6–20)
BUN/CREAT SERPL: 13.7 (ref 7–25)
CALCIUM SPEC-SCNC: 9.7 MG/DL (ref 8.6–10.5)
CHLORIDE SERPL-SCNC: 104 MMOL/L (ref 98–107)
CHOLEST SERPL-MCNC: 140 MG/DL (ref 0–200)
CO2 SERPL-SCNC: 24 MMOL/L (ref 22–29)
CREAT SERPL-MCNC: 0.73 MG/DL (ref 0.57–1)
DEPRECATED RDW RBC AUTO: 41.4 FL (ref 37–54)
EGFRCR SERPLBLD CKD-EPI 2021: 108.1 ML/MIN/1.73
EOSINOPHIL # BLD AUTO: 0.05 10*3/MM3 (ref 0–0.4)
EOSINOPHIL NFR BLD AUTO: 1.1 % (ref 0.3–6.2)
ERYTHROCYTE [DISTWIDTH] IN BLOOD BY AUTOMATED COUNT: 14.2 % (ref 12.3–15.4)
GLOBULIN UR ELPH-MCNC: 3.3 GM/DL
GLUCOSE SERPL-MCNC: 88 MG/DL (ref 65–99)
HBA1C MFR BLD: 5.9 % (ref 4.8–5.6)
HCT VFR BLD AUTO: 40.6 % (ref 34–46.6)
HCV AB SER DONR QL: NORMAL
HDLC SERPL-MCNC: 66 MG/DL (ref 40–60)
HGB BLD-MCNC: 13.3 G/DL (ref 12–15.9)
IMM GRANULOCYTES # BLD AUTO: 0.01 10*3/MM3 (ref 0–0.05)
IMM GRANULOCYTES NFR BLD AUTO: 0.2 % (ref 0–0.5)
LDLC SERPL CALC-MCNC: 61 MG/DL (ref 0–100)
LDLC/HDLC SERPL: 0.94 {RATIO}
LYMPHOCYTES # BLD AUTO: 2.09 10*3/MM3 (ref 0.7–3.1)
LYMPHOCYTES NFR BLD AUTO: 44.9 % (ref 19.6–45.3)
MCH RBC QN AUTO: 26.8 PG (ref 26.6–33)
MCHC RBC AUTO-ENTMCNC: 32.8 G/DL (ref 31.5–35.7)
MCV RBC AUTO: 81.9 FL (ref 79–97)
MONOCYTES # BLD AUTO: 0.26 10*3/MM3 (ref 0.1–0.9)
MONOCYTES NFR BLD AUTO: 5.6 % (ref 5–12)
NEUTROPHILS NFR BLD AUTO: 2.22 10*3/MM3 (ref 1.7–7)
NEUTROPHILS NFR BLD AUTO: 47.8 % (ref 42.7–76)
NRBC BLD AUTO-RTO: 0 /100 WBC (ref 0–0.2)
PLATELET # BLD AUTO: 404 10*3/MM3 (ref 140–450)
PMV BLD AUTO: 11.1 FL (ref 6–12)
POTASSIUM SERPL-SCNC: 4 MMOL/L (ref 3.5–5.2)
PROT SERPL-MCNC: 7.6 G/DL (ref 6–8.5)
RBC # BLD AUTO: 4.96 10*6/MM3 (ref 3.77–5.28)
SODIUM SERPL-SCNC: 137 MMOL/L (ref 136–145)
TRIGL SERPL-MCNC: 61 MG/DL (ref 0–150)
TSH SERPL DL<=0.05 MIU/L-ACNC: 0.73 UIU/ML (ref 0.27–4.2)
VLDLC SERPL-MCNC: 13 MG/DL (ref 5–40)
WBC NRBC COR # BLD: 4.65 10*3/MM3 (ref 3.4–10.8)

## 2023-08-11 PROCEDURE — 83036 HEMOGLOBIN GLYCOSYLATED A1C: CPT | Performed by: NURSE PRACTITIONER

## 2023-08-11 PROCEDURE — 80050 GENERAL HEALTH PANEL: CPT | Performed by: NURSE PRACTITIONER

## 2023-08-11 PROCEDURE — 82306 VITAMIN D 25 HYDROXY: CPT | Performed by: NURSE PRACTITIONER

## 2023-08-11 PROCEDURE — 36415 COLL VENOUS BLD VENIPUNCTURE: CPT | Performed by: NURSE PRACTITIONER

## 2023-08-11 PROCEDURE — 99395 PREV VISIT EST AGE 18-39: CPT | Performed by: NURSE PRACTITIONER

## 2023-08-11 PROCEDURE — 86803 HEPATITIS C AB TEST: CPT | Performed by: NURSE PRACTITIONER

## 2023-08-11 PROCEDURE — 80061 LIPID PANEL: CPT | Performed by: NURSE PRACTITIONER

## 2023-08-11 RX ORDER — ACETAZOLAMIDE 250 MG/1
250 TABLET ORAL EVERY OTHER DAY
COMMUNITY

## 2023-08-11 RX ORDER — SEMAGLUTIDE 2.4 MG/.75ML
2.4 INJECTION, SOLUTION SUBCUTANEOUS WEEKLY
Qty: 9 ML | Refills: 1 | Status: SHIPPED | OUTPATIENT
Start: 2023-08-11 | End: 2023-11-09

## 2023-08-11 RX ORDER — AMLODIPINE BESYLATE 10 MG/1
10 TABLET ORAL DAILY
Qty: 90 TABLET | Refills: 1 | Status: SHIPPED | OUTPATIENT
Start: 2023-08-11

## 2023-08-11 RX ORDER — LOSARTAN POTASSIUM 100 MG/1
100 TABLET ORAL DAILY
Qty: 90 TABLET | Refills: 1 | Status: SHIPPED | OUTPATIENT
Start: 2023-08-11

## 2023-08-11 RX ORDER — SPIRONOLACTONE 50 MG/1
50 TABLET, FILM COATED ORAL DAILY
Qty: 30 TABLET | Refills: 1 | Status: SHIPPED | OUTPATIENT
Start: 2023-08-11

## 2023-08-11 RX ORDER — ATORVASTATIN CALCIUM 40 MG/1
40 TABLET, FILM COATED ORAL DAILY
Qty: 90 TABLET | Refills: 1 | Status: SHIPPED | OUTPATIENT
Start: 2023-08-11

## 2023-08-11 NOTE — ASSESSMENT & PLAN NOTE
Basic labs in clinic today. Encouraged routine dental and eye exams. Discussed age appropriate immunizations, screenings. Age appropriate handout provided, including information on nutrition and physical activity.

## 2023-08-11 NOTE — ASSESSMENT & PLAN NOTE
Discussed the importance of mammogram. She is aware of risks, defers for now but will consider in the future.

## 2023-08-11 NOTE — ASSESSMENT & PLAN NOTE
Discussed risks of hyperkalemia with losartan and spironolactone. She would like to proceed with prescription. CMP today, will repeat BMP in one month for close monitoring.

## 2023-08-11 NOTE — ASSESSMENT & PLAN NOTE
A1c 6.5 on previous labs, technically within diabetic range. Patient has lost over 20 pounds since that time, will repeat A1c today and determine further plan of care based on results.

## 2023-08-11 NOTE — ASSESSMENT & PLAN NOTE
Mild elevation in clinic today. Continue losartan, will increase amlodipine to 10 mg. Patient should monitor blood pressure at home and call or return to clinic with consistent elevations greater than 130/80. Labs in clinic today.

## 2023-08-11 NOTE — PROGRESS NOTES
"Chief Complaint  Annual Exam (Annual physical and questions about wegovy)    Subjective         Leonarda Hogan presents to [facility]  HPI     Last labs: 1/2023  LMP: 8/2023  PAP: 8/2022, GYN  Mammogram: Family history of breast cancer in sister, diagnosed at 37  Hepatitis C screening: Agreeable   Colonoscopy: Denies family history of colon cancer  COVID19 vaccination: Up to date  Eye exam: Scheduled in September   Dental exam: Every 6 months   Smoking history: Denies   Specialists: GYN, Ophthalmology, Podiatry      Annual physical exam-  Family history of heart attack in father at 61. Denies chest pain, blurry vision, leg swelling, shortness of breath, seizure activity. Will get headaches around menstrual cycles.     HTN-  Patient was recently started on amlodipine, losartan continued. She does not check her blood pressure routinely at home but admits to elevations at doctors appointments. States she feels that readings were better when she was on 10 mg of amlodipine.     HLD-  Managed with statin. Well tolerated, denies cramping. Most recent LDL 69.    Vitamin D-  Taking once daily supplement over the counter.    Obesity-  Patient tolerating Wegovy well, has lost over twenty pounds. Denies side effects.     Idiopathic intracranial hypertension-  Diamox every other day through ophthalmology for swelling in both of her eyes. Swelling has improved, follows up in September to see how swelling has improved.     Would like to start spironolactone for acne through dermatology, wanted to be sure this was okay with losartan. [facility].     Objective     Vitals:    08/11/23 0724   BP: 134/76   BP Location: Left arm   Patient Position: Sitting   Cuff Size: Adult   Pulse: 70   Resp: 18   Temp: 97.8 øF (36.6 øC)   TempSrc: Temporal   SpO2: 99%   Weight: (!) 148 kg (327 lb 6 oz)   Height: 170.2 cm (67.01\")      Body mass index is 51.26 " kg/mý.    Wt Readings from Last 3 Encounters:   08/11/23 (!) 148 kg (327 lb 6 oz)   07/27/23 (!) 149 kg (327 lb 12.8 oz)   06/21/23 (!) 151 kg (332 lb 3.2 oz)     BP Readings from Last 3 Encounters:   08/11/23 134/76   07/27/23 141/82   06/21/23 119/78                Physical Exam  Constitutional:       Appearance: Normal appearance.   HENT:      Head: Normocephalic and atraumatic.      Nose: Nose normal.      Mouth/Throat:      Mouth: Mucous membranes are moist.      Pharynx: Oropharynx is clear.   Eyes:      Extraocular Movements: Extraocular movements intact.      Conjunctiva/sclera: Conjunctivae normal.      Pupils: Pupils are equal, round, and reactive to light.   Neck:      Thyroid: No thyroid mass, thyromegaly or thyroid tenderness.   Cardiovascular:      Rate and Rhythm: Normal rate and regular rhythm.      Heart sounds: Normal heart sounds.   Pulmonary:      Effort: Pulmonary effort is normal.      Breath sounds: Normal breath sounds.   Skin:     General: Skin is warm and dry.   Neurological:      General: No focal deficit present.      Mental Status: She is alert and oriented to person, place, and time.   Psychiatric:         Mood and Affect: Mood normal.         Behavior: Behavior normal.         Thought Content: Thought content normal.        Result Review :   The following data was reviewed by: PAM Sherwood on 08/11/2023:      Procedures    Assessment and Plan   Diagnoses and all orders for this visit:    1. Annual physical exam (Primary)  Assessment & Plan:  Basic labs in clinic today. Encouraged routine dental and eye exams. Discussed age appropriate immunizations, screenings. Age appropriate handout provided, including information on nutrition and physical activity.        2. Essential hypertension  Assessment & Plan:  Mild elevation in clinic today. Continue losartan, will increase amlodipine to 10 mg. Patient should monitor blood pressure at home and call or return to clinic with consistent  elevations greater than 130/80. Labs in clinic today.      Orders:  -     Comprehensive Metabolic Panel  -     CBC & Differential  -     TSH  -     Basic metabolic panel; Future    3. Hyperlipidemia, unspecified hyperlipidemia type  Assessment & Plan:  Well controlled, continue statin. Most recent LDL 69. Lipid panel with labs.      Orders:  -     Lipid Panel    4. Vitamin D deficiency  Assessment & Plan:  Continue vitamin D supplement, vitamin D level with labs today.       Orders:  -     Vitamin D,25-Hydroxy    5. Impaired fasting glucose  Assessment & Plan:  A1c 6.5 on previous labs, technically within diabetic range. Patient has lost over 20 pounds since that time, will repeat A1c today and determine further plan of care based on results.     Orders:  -     Hemoglobin A1c    6. Class 3 severe obesity due to excess calories with serious comorbidity and body mass index (BMI) of 50.0 to 59.9 in adult  Assessment & Plan:  Continue Wegovy as prescribed, will increase to 2.4 mg dosage once 1.7 is completed.       7. Family history of breast cancer in sister  Assessment & Plan:  Discussed the importance of mammogram. She is aware of risks, defers for now but will consider in the future.      8. IIH (idiopathic intracranial hypertension)  Assessment & Plan:  Continue Diamox through ophthalmology.       9. Acne vulgaris  Assessment & Plan:  Discussed risks of hyperkalemia with losartan and spironolactone. She would like to proceed with prescription. CMP today, will repeat BMP in one month for close monitoring.       10. Screening for condition  Comments:  Hepatitis C screening with labs.  Orders:  -     Hepatitis C antibody    Other orders  -     amLODIPine (NORVASC) 10 MG tablet; Take 1 tablet by mouth Daily.  Dispense: 90 tablet; Refill: 1  -     atorvastatin (LIPITOR) 40 MG tablet; Take 1 tablet by mouth Daily.  Dispense: 90 tablet; Refill: 1  -     losartan (COZAAR) 100 MG tablet; Take 1 tablet by mouth Daily.   Dispense: 90 tablet; Refill: 1  -     Semaglutide-Weight Management (Wegovy) 2.4 MG/0.75ML solution auto-injector; Inject 2.4 mg under the skin into the appropriate area as directed 1 (One) Time Per Week for 90 days.  Dispense: 9 mL; Refill: 1  -     spironolactone (Aldactone) 50 MG tablet; Take 1 tablet by mouth Daily.  Dispense: 30 tablet; Refill: 1          Follow Up   Return in about 3 months (around 11/11/2023).  Patient was given instructions and counseling regarding her condition or for health maintenance advice. Please see specific information pulled into the AVS if appropriate.

## 2023-08-14 RX ORDER — ERGOCALCIFEROL 1.25 MG/1
50000 CAPSULE ORAL WEEKLY
Qty: 12 CAPSULE | Refills: 1 | Status: SHIPPED | OUTPATIENT
Start: 2023-08-14

## 2023-08-29 ENCOUNTER — PRIOR AUTHORIZATION (OUTPATIENT)
Dept: INTERNAL MEDICINE | Facility: CLINIC | Age: 38
End: 2023-08-29
Payer: COMMERCIAL

## 2023-08-29 ENCOUNTER — TELEPHONE (OUTPATIENT)
Dept: INTERNAL MEDICINE | Facility: CLINIC | Age: 38
End: 2023-08-29
Payer: COMMERCIAL

## 2023-08-29 NOTE — TELEPHONE ENCOUNTER
Caller: Leonarda Hogan    Relationship: Self    Best call back number: [phone]     What medications are you currently taking:   Current Outpatient Medications on File Prior to Visit   Medication Sig Dispense Refill    acetaZOLAMIDE (DIAMOX) 250 MG tablet Take 1 tablet by mouth Every Other Day.      albuterol sulfate  (90 Base) MCG/ACT inhaler Inhale 2 puffs Every 4 (Four) Hours As Needed for Wheezing or Shortness of Air. 18 g 3    amLODIPine (NORVASC) 10 MG tablet Take 1 tablet by mouth Daily. 90 tablet 1    Arazlo 0.045 % lotion       atorvastatin (LIPITOR) 40 MG tablet Take 1 tablet by mouth Daily. 90 tablet 1    losartan (COZAAR) 100 MG tablet Take 1 tablet by mouth Daily. 90 tablet 1    minocycline (MINOCIN,DYNACIN) 100 MG capsule TAKE 1 CAPSULE BY MOUTH TWICE DAILY WITH A FULL GLASS OF WATER. DO NOT LIE DOWN 1 HOUR AFTER TAKING      Semaglutide-Weight Management (Wegovy) 2.4 MG/0.75ML solution auto-injector Inject 2.4 mg under the skin into the appropriate area as directed 1 (One) Time Per Week for 90 days. 9 mL 1    spironolactone (Aldactone) 50 MG tablet Take 1 tablet by mouth Daily. 30 tablet 1    vitamin D (ERGOCALCIFEROL) 1.25 MG (07327 UT) capsule capsule Take 1 capsule by mouth 1 (One) Time Per Week. 12 capsule 1    vitamin D3 125 MCG (5000 UT) capsule capsule Take 1 capsule by mouth Daily.       No current facility-administered medications on file prior to visit.              Which medication are you concerned about: Semaglutide-Weight Management (Wegovy) 2.4 MG/0.75ML solution auto-injector         What are your concerns: PATIENT STATED SHE IS NEEDING PA FOR MEDICATION TO BE ABLE TO REFILL FOR INSURANCE     PLEASE ADVISE              Yes

## 2023-08-31 NOTE — TELEPHONE ENCOUNTER
This is to inform you that your Prior Authorization request for the above member's Wegovy  2.4MG/0.75ML SC SOAJ has been approved. If you are changing the member's therapy the  previously approved therapy will be canceled and replaced.  The authorization is valid from 07/30/2023 through 02/25/2024. A letter of explanation will also be  mailed to the patient.   Transperineal Fusion Biopsy

## 2023-09-06 ENCOUNTER — TELEPHONE (OUTPATIENT)
Dept: INTERNAL MEDICINE | Facility: CLINIC | Age: 38
End: 2023-09-06

## 2023-09-06 NOTE — TELEPHONE ENCOUNTER
Pharmacy Name:  [facility]    Pharmacy representative name: CLAUDE    Pharmacy representative phone number: [phone]     What medication are you calling in regards to: WEGOVY    What question does the pharmacy have: CLAUDE STATED THAT THIS PRESCRIPTION WAS CANCELED ON THEIR END AND NEEDING CLARIFICATION AND VARI FICATION THAT IS CORRECT.     Who is the provider that prescribed the medication: PAT PADRON

## 2023-09-11 ENCOUNTER — OFFICE VISIT (OUTPATIENT)
Dept: INTERNAL MEDICINE | Facility: CLINIC | Age: 38
End: 2023-09-11
Payer: COMMERCIAL

## 2023-09-11 VITALS
BODY MASS INDEX: 51.89 KG/M2 | OXYGEN SATURATION: 97 % | SYSTOLIC BLOOD PRESSURE: 118 MMHG | HEART RATE: 62 BPM | WEIGHT: 293 LBS | DIASTOLIC BLOOD PRESSURE: 79 MMHG | TEMPERATURE: 97.3 F

## 2023-09-11 DIAGNOSIS — L70.0 ACNE VULGARIS: ICD-10-CM

## 2023-09-11 DIAGNOSIS — L29.9 ITCHING OF EAR: ICD-10-CM

## 2023-09-11 DIAGNOSIS — E66.01 CLASS 3 SEVERE OBESITY WITHOUT SERIOUS COMORBIDITY WITH BODY MASS INDEX (BMI) OF 50.0 TO 59.9 IN ADULT, UNSPECIFIED OBESITY TYPE: ICD-10-CM

## 2023-09-11 DIAGNOSIS — I10 ESSENTIAL HYPERTENSION: Primary | ICD-10-CM

## 2023-09-11 LAB
ALBUMIN SERPL-MCNC: 4.2 G/DL (ref 3.5–5.2)
ALBUMIN/GLOB SERPL: 1.2 G/DL
ALP SERPL-CCNC: 72 U/L (ref 39–117)
ALT SERPL W P-5'-P-CCNC: 14 U/L (ref 1–33)
ANION GAP SERPL CALCULATED.3IONS-SCNC: 10.1 MMOL/L (ref 5–15)
AST SERPL-CCNC: 18 U/L (ref 1–32)
BILIRUB SERPL-MCNC: 0.6 MG/DL (ref 0–1.2)
BUN SERPL-MCNC: 7 MG/DL (ref 6–20)
BUN/CREAT SERPL: 8 (ref 7–25)
CALCIUM SPEC-SCNC: 10.1 MG/DL (ref 8.6–10.5)
CHLORIDE SERPL-SCNC: 103 MMOL/L (ref 98–107)
CO2 SERPL-SCNC: 25.9 MMOL/L (ref 22–29)
CREAT SERPL-MCNC: 0.87 MG/DL (ref 0.57–1)
EGFRCR SERPLBLD CKD-EPI 2021: 87.6 ML/MIN/1.73
GLOBULIN UR ELPH-MCNC: 3.5 GM/DL
GLUCOSE SERPL-MCNC: 80 MG/DL (ref 65–99)
POTASSIUM SERPL-SCNC: 4.4 MMOL/L (ref 3.5–5.2)
PROT SERPL-MCNC: 7.7 G/DL (ref 6–8.5)
SODIUM SERPL-SCNC: 139 MMOL/L (ref 136–145)

## 2023-09-11 PROCEDURE — 80053 COMPREHEN METABOLIC PANEL: CPT | Performed by: NURSE PRACTITIONER

## 2023-09-11 PROCEDURE — 99214 OFFICE O/P EST MOD 30 MIN: CPT | Performed by: NURSE PRACTITIONER

## 2023-09-11 RX ORDER — CETIRIZINE HYDROCHLORIDE 10 MG/1
10 TABLET ORAL DAILY
Qty: 90 TABLET | Refills: 1 | Status: SHIPPED | OUTPATIENT
Start: 2023-09-11

## 2023-09-11 NOTE — ASSESSMENT & PLAN NOTE
Well controlled, continue losartan and amlodipine. Patient should monitor blood pressure at home and call or return to clinic with consistent elevations greater than 130/80.

## 2023-09-11 NOTE — ASSESSMENT & PLAN NOTE
Exam overall without concern, will trial Zyrtec. She should notify clinic if symptoms worsen or persist.

## 2023-09-11 NOTE — ASSESSMENT & PLAN NOTE
Continue spironolactone. CMP today to evaluate potassium levels due to combination of spironolactone and ARB.

## 2023-09-11 NOTE — PROGRESS NOTES
Chief Complaint  Med Management (Labs )    Subjective         Leonarda oHgan presents to [facility]  HPI     HTN-  Amlodipine increased at previous visit, she continues losartan. States blood pressure readings averaging 120s/80s prior to recent vacation. Admits she has not been checking recently. Denies chest pain, shortness of breath, leg swelling. Tolerating medication adjustments well.     Acne-  Has been on spironolactone x 1 month. In clinic today to check potassium levels.     Started 2.4 dosing of Wegovy yesterday, so far no side effects. States the gas has started to improve.     Patient reports bilateral ear itching, has not been swimming. Worse over the past few months.         Objective     Vitals:    09/11/23 0953   BP: 118/79   BP Location: Left arm   Pulse: 62   Temp: 97.3 °F (36.3 °C)   TempSrc: Temporal   SpO2: 97%   Weight: (!) 150 kg (331 lb 6.4 oz)      Body mass index is 51.89 kg/m².    Wt Readings from Last 3 Encounters:   09/11/23 (!) 150 kg (331 lb 6.4 oz)   08/11/23 (!) 148 kg (327 lb 6 oz)   07/27/23 (!) 149 kg (327 lb 12.8 oz)     BP Readings from Last 3 Encounters:   09/11/23 118/79   08/11/23 134/76   07/27/23 141/82                Physical Exam  Constitutional:       Appearance: Normal appearance.   HENT:      Head: Normocephalic and atraumatic.      Right Ear: Ear canal and external ear normal.      Left Ear: Ear canal and external ear normal.      Ears:      Comments: Bilateral TM nonpurulent effusion     Nose: Nose normal.      Mouth/Throat:      Mouth: Mucous membranes are moist.      Pharynx: Oropharynx is clear.   Eyes:      Extraocular Movements: Extraocular movements intact.      Conjunctiva/sclera: Conjunctivae normal.      Pupils: Pupils are equal, round, and reactive to light.   Cardiovascular:      Rate and Rhythm: Normal rate and regular rhythm.      Heart sounds: Normal heart sounds.   Pulmonary:      Effort: Pulmonary  effort is normal.      Breath sounds: Normal breath sounds.   Skin:     General: Skin is warm and dry.   Neurological:      General: No focal deficit present.      Mental Status: She is alert and oriented to person, place, and time.   Psychiatric:         Mood and Affect: Mood normal.         Behavior: Behavior normal.         Thought Content: Thought content normal.        Result Review :   The following data was reviewed by: PAM Sherwood on 09/11/2023:      Procedures    Assessment and Plan   Diagnoses and all orders for this visit:    1. Essential hypertension (Primary)  Assessment & Plan:  Well controlled, continue losartan and amlodipine. Patient should monitor blood pressure at home and call or return to clinic with consistent elevations greater than 130/80.       Orders:  -     Comprehensive metabolic panel  -     Cancel: Basic metabolic panel    2. Acne vulgaris  Assessment & Plan:  Continue spironolactone. CMP today to evaluate potassium levels due to combination of spironolactone and ARB.     Orders:  -     Comprehensive metabolic panel    3. Itching of ear  Assessment & Plan:  Exam overall without concern, will trial Zyrtec. She should notify clinic if symptoms worsen or persist.       4. Class 3 severe obesity without serious comorbidity with body mass index (BMI) of 50.0 to 59.9 in adult, unspecified obesity type  Assessment & Plan:  Continue Wegovy, discussed GI side effects and expectations with dosage increase.       Other orders  -     cetirizine (zyrTEC) 10 MG tablet; Take 1 tablet by mouth Daily.  Dispense: 90 tablet; Refill: 1          Follow Up   Return for Next scheduled follow up.  Patient was given instructions and counseling regarding her condition or for health maintenance advice. Please see specific information pulled into the AVS if appropriate.

## 2023-09-18 ENCOUNTER — OFFICE VISIT (OUTPATIENT)
Dept: OBSTETRICS AND GYNECOLOGY | Facility: CLINIC | Age: 38
End: 2023-09-18
Payer: COMMERCIAL

## 2023-09-18 VITALS
BODY MASS INDEX: 45.99 KG/M2 | HEIGHT: 67 IN | SYSTOLIC BLOOD PRESSURE: 131 MMHG | WEIGHT: 293 LBS | DIASTOLIC BLOOD PRESSURE: 71 MMHG | HEART RATE: 91 BPM

## 2023-09-18 DIAGNOSIS — Z01.419 ENCOUNTER FOR GYNECOLOGICAL EXAMINATION WITHOUT ABNORMAL FINDING: Primary | ICD-10-CM

## 2023-09-18 PROCEDURE — 87624 HPV HI-RISK TYP POOLED RSLT: CPT

## 2023-09-18 PROCEDURE — G0123 SCREEN CERV/VAG THIN LAYER: HCPCS

## 2023-09-18 NOTE — PROGRESS NOTES
"Well Woman Visit    CC: Annual well woman exam       HPI:   38 y.o. Contraception or HRT: Contraception:  Condoms  Menses:   q mon, lasts 5 days, changes products q 2hrs on heaviest days.   Pain:  Mild, OTC meds control discomfort  Incontinence concerns: No  Hx of abnormal pap:  Yes  Pt has no complaints today.      History: PMHx, Meds, Allergies, PSHx, Social Hx, and POBHx all reviewed and updated.      PHYSICAL EXAM:  /71   Pulse 91   Ht 170.2 cm (67.01\")   Wt (!) 149 kg (328 lb)   LMP 2023 (Approximate)   BMI 51.36 kg/m²   General- NAD, alert and oriented, appropriate  Psych- Normal mood, good memory  Neck- No masses, no thyroid enlargement  CV- Regular rhythm, no murnurs  Resp- CTA to bases, no wheezes  Abdomen- Soft, non distended, non tender, no masses    Breast left-  Bilaterally symmetrical, no masses, non tender, no nipple discharge  Breast right- Bilaterally symmetrical, no masses, non tender, no nipple discharge    External genitalia- Normal female, no lesions  Urethra/meatus- Normal, no masses, non tender, no prolapse  Bladder- Normal, no masses, non tender, no prolapse  Vagina- Normal, no atrophy, no lesions, no discharge, no prolapse  Cvx- Normal, no lesions, no discharge, No cervical motion tenderness  Uterus-  difficult to palpate  Adnexa- Difficult to palpate  Anus/Rectum/Perineum- Not performed    Lymphatic- No palpable neck, axillary, or groin nodes  Ext- No edema, no cyanosis    Skin- No lesions, no rashes, no acanthosis nigricans        ASSESSMENT and PLAN:  WWE    Diagnoses and all orders for this visit:    1. Encounter for gynecological examination without abnormal finding (Primary)  -     IgP, Aptima HPV        Counseling:     Track menses, RTO IF <q21d, >7d long, or heavy    Domestic violence/abuse screen: negative    Depression screen: no SI    Preventative:   BREAST HEALTH- Monthly self breast exam importance and how to reviewed. MMG and/or MRI (prn) reviewed per " society guidelines and her individual history. Mammo/MRI screen: Not medically needed.  CERVICAL CANCER Screening- Reviewed current ASCCP guidelines for screening w and wo cotest HPV, age specific.  Screen: Updated today.  COLON CANCER Screening- Reviewed current medical society guidelines and options.  Colonoscopy screen:  Not medically needed.  SEXUAL HEALTH: Declines STD screening.  VACCINATIONS Recommended: Flu annually, Gardisil/HPV vaccine (up to 46yo).  Importance discussed, risk being unvaccinated reviewed.  Questions answered  Smoking status- NON SMOKER.  Importance of avoiding second hand smoke.  Follow up PCP/Specialist PMHx and Labs  Myriad: Counseled and evaluated for hereditary cancer testing. Testing declined.  Gardasil status: completed.      She understands the importance of having any ordered tests to be performed in a timely fashion.  She is encouraged to review her results online and/or contact or office if she has questions.     Follow Up:  Return if symptoms worsen or fail to improve.      Elizabeth Lara, APRN  09/18/2023

## 2023-09-22 LAB
CYTOLOGIST CVX/VAG CYTO: ABNORMAL
CYTOLOGY CVX/VAG DOC CYTO: ABNORMAL
CYTOLOGY CVX/VAG DOC THIN PREP: ABNORMAL
DX ICD CODE: ABNORMAL
DX ICD CODE: ABNORMAL
HIV 1 & 2 AB SER-IMP: ABNORMAL
HPV I/H RISK 4 DNA CVX QL PROBE+SIG AMP: NEGATIVE
OTHER STN SPEC: ABNORMAL
PATHOLOGIST CVX/VAG CYTO: ABNORMAL
RECOM F/U CVX/VAG CYTO: ABNORMAL
STAT OF ADQ CVX/VAG CYTO-IMP: ABNORMAL

## 2023-09-27 ENCOUNTER — TELEPHONE (OUTPATIENT)
Dept: OBSTETRICS AND GYNECOLOGY | Facility: CLINIC | Age: 38
End: 2023-09-27
Payer: COMMERCIAL

## 2023-09-27 NOTE — TELEPHONE ENCOUNTER
Discussed with patient that she needs Co-test in three years but since she has had hx of abnormal she may want to repeat it in one year. Patient wishes to repeat pap in one year.

## 2023-09-27 NOTE — TELEPHONE ENCOUNTER
----- Message from PAM De La O sent at 9/26/2023 10:47 AM EDT -----  Co-test in three years but since she has had hx of abnormal she may want to repeat it in one year. Thanks

## 2023-09-28 RX ORDER — SPIRONOLACTONE 50 MG/1
TABLET, FILM COATED ORAL
Qty: 30 TABLET | Refills: 0 | Status: SHIPPED | OUTPATIENT
Start: 2023-09-28

## 2023-10-06 RX ORDER — SEMAGLUTIDE 2.4 MG/.75ML
2.4 INJECTION, SOLUTION SUBCUTANEOUS WEEKLY
Qty: 9 ML | Refills: 1 | Status: SHIPPED | OUTPATIENT
Start: 2023-10-06 | End: 2024-01-04

## 2023-11-14 ENCOUNTER — OFFICE VISIT (OUTPATIENT)
Dept: INTERNAL MEDICINE | Facility: CLINIC | Age: 38
End: 2023-11-14
Payer: COMMERCIAL

## 2023-11-14 VITALS
OXYGEN SATURATION: 99 % | WEIGHT: 293 LBS | SYSTOLIC BLOOD PRESSURE: 128 MMHG | BODY MASS INDEX: 45.99 KG/M2 | HEIGHT: 67 IN | HEART RATE: 77 BPM | TEMPERATURE: 97.6 F | DIASTOLIC BLOOD PRESSURE: 80 MMHG | RESPIRATION RATE: 18 BRPM

## 2023-11-14 DIAGNOSIS — J30.9 ALLERGIC RHINITIS, UNSPECIFIED SEASONALITY, UNSPECIFIED TRIGGER: ICD-10-CM

## 2023-11-14 DIAGNOSIS — R73.03 PREDIABETES: ICD-10-CM

## 2023-11-14 DIAGNOSIS — E55.9 VITAMIN D DEFICIENCY: ICD-10-CM

## 2023-11-14 DIAGNOSIS — E66.01 CLASS 3 SEVERE OBESITY WITHOUT SERIOUS COMORBIDITY WITH BODY MASS INDEX (BMI) OF 50.0 TO 59.9 IN ADULT, UNSPECIFIED OBESITY TYPE: ICD-10-CM

## 2023-11-14 DIAGNOSIS — I10 ESSENTIAL HYPERTENSION: Primary | ICD-10-CM

## 2023-11-14 DIAGNOSIS — Z80.3 FAMILY HISTORY OF BREAST CANCER IN SISTER: ICD-10-CM

## 2023-11-14 DIAGNOSIS — E78.5 HYPERLIPIDEMIA, UNSPECIFIED HYPERLIPIDEMIA TYPE: ICD-10-CM

## 2023-11-14 DIAGNOSIS — L70.0 ACNE VULGARIS: ICD-10-CM

## 2023-11-14 DIAGNOSIS — J45.20 MILD INTERMITTENT ASTHMA WITHOUT COMPLICATION: ICD-10-CM

## 2023-11-14 DIAGNOSIS — G93.2 IIH (IDIOPATHIC INTRACRANIAL HYPERTENSION): ICD-10-CM

## 2023-11-14 LAB
25(OH)D3 SERPL-MCNC: 40.2 NG/ML (ref 30–100)
ALBUMIN SERPL-MCNC: 4.4 G/DL (ref 3.5–5.2)
ALBUMIN/GLOB SERPL: 1.4 G/DL
ALP SERPL-CCNC: 71 U/L (ref 39–117)
ALT SERPL W P-5'-P-CCNC: 16 U/L (ref 1–33)
ANION GAP SERPL CALCULATED.3IONS-SCNC: 9 MMOL/L (ref 5–15)
AST SERPL-CCNC: 13 U/L (ref 1–32)
BASOPHILS # BLD AUTO: 0.02 10*3/MM3 (ref 0–0.2)
BASOPHILS NFR BLD AUTO: 0.3 % (ref 0–1.5)
BILIRUB SERPL-MCNC: 0.6 MG/DL (ref 0–1.2)
BUN SERPL-MCNC: 9 MG/DL (ref 6–20)
BUN/CREAT SERPL: 10.5 (ref 7–25)
CALCIUM SPEC-SCNC: 9.8 MG/DL (ref 8.6–10.5)
CHLORIDE SERPL-SCNC: 104 MMOL/L (ref 98–107)
CHOLEST SERPL-MCNC: 141 MG/DL (ref 0–200)
CO2 SERPL-SCNC: 24 MMOL/L (ref 22–29)
CREAT SERPL-MCNC: 0.86 MG/DL (ref 0.57–1)
DEPRECATED RDW RBC AUTO: 41.2 FL (ref 37–54)
EGFRCR SERPLBLD CKD-EPI 2021: 88.8 ML/MIN/1.73
EOSINOPHIL # BLD AUTO: 0.12 10*3/MM3 (ref 0–0.4)
EOSINOPHIL NFR BLD AUTO: 2.1 % (ref 0.3–6.2)
ERYTHROCYTE [DISTWIDTH] IN BLOOD BY AUTOMATED COUNT: 13.6 % (ref 12.3–15.4)
GLOBULIN UR ELPH-MCNC: 3.1 GM/DL
GLUCOSE SERPL-MCNC: 72 MG/DL (ref 65–99)
HCT VFR BLD AUTO: 38.5 % (ref 34–46.6)
HDLC SERPL-MCNC: 68 MG/DL (ref 40–60)
HGB BLD-MCNC: 12.7 G/DL (ref 12–15.9)
IMM GRANULOCYTES # BLD AUTO: 0.01 10*3/MM3 (ref 0–0.05)
IMM GRANULOCYTES NFR BLD AUTO: 0.2 % (ref 0–0.5)
LDLC SERPL CALC-MCNC: 60 MG/DL (ref 0–100)
LDLC/HDLC SERPL: 0.88 {RATIO}
LYMPHOCYTES # BLD AUTO: 1.9 10*3/MM3 (ref 0.7–3.1)
LYMPHOCYTES NFR BLD AUTO: 32.5 % (ref 19.6–45.3)
MCH RBC QN AUTO: 27.6 PG (ref 26.6–33)
MCHC RBC AUTO-ENTMCNC: 33 G/DL (ref 31.5–35.7)
MCV RBC AUTO: 83.7 FL (ref 79–97)
MONOCYTES # BLD AUTO: 0.35 10*3/MM3 (ref 0.1–0.9)
MONOCYTES NFR BLD AUTO: 6 % (ref 5–12)
NEUTROPHILS NFR BLD AUTO: 3.45 10*3/MM3 (ref 1.7–7)
NEUTROPHILS NFR BLD AUTO: 58.9 % (ref 42.7–76)
NRBC BLD AUTO-RTO: 0 /100 WBC (ref 0–0.2)
PLATELET # BLD AUTO: 402 10*3/MM3 (ref 140–450)
PMV BLD AUTO: 10.6 FL (ref 6–12)
POTASSIUM SERPL-SCNC: 4.1 MMOL/L (ref 3.5–5.2)
PROT SERPL-MCNC: 7.5 G/DL (ref 6–8.5)
RBC # BLD AUTO: 4.6 10*6/MM3 (ref 3.77–5.28)
SODIUM SERPL-SCNC: 137 MMOL/L (ref 136–145)
TRIGL SERPL-MCNC: 66 MG/DL (ref 0–150)
VLDLC SERPL-MCNC: 13 MG/DL (ref 5–40)
WBC NRBC COR # BLD: 5.85 10*3/MM3 (ref 3.4–10.8)

## 2023-11-14 PROCEDURE — 80053 COMPREHEN METABOLIC PANEL: CPT | Performed by: NURSE PRACTITIONER

## 2023-11-14 PROCEDURE — 85025 COMPLETE CBC W/AUTO DIFF WBC: CPT | Performed by: NURSE PRACTITIONER

## 2023-11-14 PROCEDURE — 82306 VITAMIN D 25 HYDROXY: CPT | Performed by: NURSE PRACTITIONER

## 2023-11-14 PROCEDURE — 80061 LIPID PANEL: CPT | Performed by: NURSE PRACTITIONER

## 2023-11-14 RX ORDER — FLUTICASONE PROPIONATE 50 MCG
2 SPRAY, SUSPENSION (ML) NASAL DAILY
Qty: 16 G | Refills: 1 | Status: SHIPPED | OUTPATIENT
Start: 2023-11-14

## 2023-11-14 RX ORDER — ALBUTEROL SULFATE 90 UG/1
2 AEROSOL, METERED RESPIRATORY (INHALATION) EVERY 4 HOURS PRN
Qty: 18 G | Refills: 1 | Status: SHIPPED | OUTPATIENT
Start: 2023-11-14

## 2023-11-14 RX ORDER — ALBUTEROL SULFATE 90 UG/1
2 AEROSOL, METERED RESPIRATORY (INHALATION) EVERY 4 HOURS PRN
Qty: 18 G | Refills: 3 | Status: SHIPPED | OUTPATIENT
Start: 2023-11-14 | End: 2023-11-14 | Stop reason: SDUPTHER

## 2023-11-14 RX ORDER — HYDROQUINONE 40 MG/G
1 CREAM TOPICAL NIGHTLY
COMMUNITY
Start: 2023-10-19

## 2023-11-14 NOTE — PROGRESS NOTES
"Chief Complaint  Hypertension (3 month follow up ), Hyperlipidemia, and Obesity    Subjective         Leonarda Hogan presents to [facility]  HPI     HTN-  Managed with losartan and amlodipine. She has not been checking her blood pressure at home. Denies chest pain, blurry vision, headache, leg swelling.     Acne-  Managed with spironolactone.  Patient has no concerns today.    Obesity-  Managed with Wegovy. Patient states she has been able to get this at the pharmacy. Admits she has not been eating well, has recently been traveling. Continues to have gas but does want to stay on medication.  She would like to postpone A1c today, most recent 5.9.    HLD-  Managed with statin. Well tolerated, denies leg cramping.  Most recent LDL 61.    Allergic rhinitis-  Managed with Zyrtec. States her ears are itchy every day.     Asthma-  Managed with PRN albuterol. States she needs a refill, gets worse with weather changes.    Vitamin D deficiency-  Continues oral supplement.    Idiopathic intracranial hypertension-  Patient scheduled for follow up on the 27th with ophthalmology, hoping to discontinue Diamox. Has been on this for a year now.     PAP smear: 9/2023  Mammogram: Family history of breast cancer in sister diagnosed in her 30s  Influenza vaccination: Declines  COVID vaccination: Up to date    Objective     Vitals:    11/14/23 0734   BP: 128/80   BP Location: Left arm   Patient Position: Sitting   Cuff Size: Large Adult   Pulse: 77   Resp: 18   Temp: 97.6 °F (36.4 °C)   SpO2: 99%   Weight: (!) 149 kg (329 lb)   Height: 170.2 cm (67.01\")      Body mass index is 51.51 kg/m².    Wt Readings from Last 3 Encounters:   11/14/23 (!) 149 kg (329 lb)   09/18/23 (!) 149 kg (328 lb)   09/11/23 (!) 150 kg (331 lb 6.4 oz)     BP Readings from Last 3 Encounters:   11/14/23 128/80   09/18/23 131/71   09/11/23 118/79                  Physical Exam  Constitutional:       Appearance: " Normal appearance.   HENT:      Head: Normocephalic and atraumatic.      Ears:      Comments: Bilateral TM nonpurulent effusion     Nose: Nose normal.      Mouth/Throat:      Mouth: Mucous membranes are moist.      Pharynx: Oropharynx is clear.   Eyes:      Extraocular Movements: Extraocular movements intact.      Conjunctiva/sclera: Conjunctivae normal.      Pupils: Pupils are equal, round, and reactive to light.   Neck:      Thyroid: No thyroid mass, thyromegaly or thyroid tenderness.   Cardiovascular:      Rate and Rhythm: Normal rate and regular rhythm.      Heart sounds: Normal heart sounds.   Pulmonary:      Effort: Pulmonary effort is normal.      Breath sounds: Normal breath sounds.   Skin:     General: Skin is warm and dry.   Neurological:      General: No focal deficit present.      Mental Status: She is alert and oriented to person, place, and time.   Psychiatric:         Mood and Affect: Mood normal.         Behavior: Behavior normal.         Thought Content: Thought content normal.          Result Review :   The following data was reviewed by: PAM Sherwood on 11/14/2023:      Procedures    Assessment and Plan   Diagnoses and all orders for this visit:    1. Essential hypertension (Primary)  Assessment & Plan:  Well controlled, continue losartan and amlodipine. Patient should monitor blood pressure at home and call or return to clinic with consistent elevations greater than 130/80. Labs in clinic today.      Orders:  -     CBC & Differential  -     Comprehensive Metabolic Panel    2. Hyperlipidemia, unspecified hyperlipidemia type  Assessment & Plan:  Well controlled, continue statin. Most recent LDL 61. Lipid panel with labs.      Orders:  -     Lipid Panel    3. IIH (idiopathic intracranial hypertension)  Assessment & Plan:  Continue to follow with ophthalmology for management of Diamox.      4. Class 3 severe obesity without serious comorbidity with body mass index (BMI) of 50.0 to 59.9 in  adult, unspecified obesity type  Assessment & Plan:  Continue Wegovy.  Patient to work on increasing physical activity and healthy food intake to improve naturally.  She would like to postpone A1c x3 months.  Most recent A1c 5.9.      5. Prediabetes    6. Allergic rhinitis, unspecified seasonality, unspecified trigger  Assessment & Plan:  Continue Zyrtec, will add Flonase.      7. Mild intermittent asthma without complication  Assessment & Plan:  Continue albuterol as needed.    Orders:  -     albuterol sulfate  (90 Base) MCG/ACT inhaler; Inhale 2 puffs Every 4 (Four) Hours As Needed for Wheezing or Shortness of Air.  Dispense: 18 g; Refill: 1    8. Vitamin D deficiency  Assessment & Plan:  Continue vitamin D supplement, vitamin D level with labs today.       Orders:  -     Vitamin D,25-Hydroxy    9. Acne vulgaris  Assessment & Plan:  Continue spironolactone, CMP today to monitor electrolytes.      10. Family history of breast cancer in sister  Assessment & Plan:  Encouraged mammogram, patient declines.  She is aware of risk with family history.      Other orders  -     Discontinue: albuterol sulfate  (90 Base) MCG/ACT inhaler; Inhale 2 puffs Every 4 (Four) Hours As Needed for Wheezing or Shortness of Air.  Dispense: 18 g; Refill: 3  -     fluticasone (FLONASE) 50 MCG/ACT nasal spray; 2 sprays into the nostril(s) as directed by provider Daily.  Dispense: 16 g; Refill: 1          Follow Up   Return in about 3 months (around 2/14/2024).  Patient was given instructions and counseling regarding her condition or for health maintenance advice. Please see specific information pulled into the AVS if appropriate.

## 2023-11-14 NOTE — ASSESSMENT & PLAN NOTE
Continue Wegovy.  Patient to work on increasing physical activity and healthy food intake to improve naturally.  She would like to postpone A1c x3 months.  Most recent A1c 5.9.

## 2023-11-14 NOTE — ASSESSMENT & PLAN NOTE
Well controlled, continue losartan and amlodipine. Patient should monitor blood pressure at home and call or return to clinic with consistent elevations greater than 130/80. Labs in clinic today.

## 2024-02-01 ENCOUNTER — OFFICE VISIT (OUTPATIENT)
Dept: SLEEP MEDICINE | Facility: HOSPITAL | Age: 39
End: 2024-02-01
Payer: COMMERCIAL

## 2024-02-01 VITALS
DIASTOLIC BLOOD PRESSURE: 77 MMHG | OXYGEN SATURATION: 95 % | WEIGHT: 293 LBS | HEIGHT: 67 IN | BODY MASS INDEX: 45.99 KG/M2 | SYSTOLIC BLOOD PRESSURE: 132 MMHG | HEART RATE: 77 BPM

## 2024-02-01 DIAGNOSIS — G47.33 OSA (OBSTRUCTIVE SLEEP APNEA): Primary | ICD-10-CM

## 2024-02-01 PROCEDURE — G0463 HOSPITAL OUTPT CLINIC VISIT: HCPCS

## 2024-02-01 RX ORDER — HYDROCHLOROTHIAZIDE 25 MG/1
1 TABLET ORAL DAILY
COMMUNITY

## 2024-02-01 RX ORDER — SEMAGLUTIDE 2.4 MG/.75ML
INJECTION, SOLUTION SUBCUTANEOUS
COMMUNITY
Start: 2024-01-24

## 2024-02-01 NOTE — PROGRESS NOTES
"[facility]         Dr Rubin Caicedo  [unfilled]  Patient Care Team:  Saira Saucedo APRN as PCP - General (Nurse Practitioner)    Chief Complaint:Apnea index 11.6 events per hour consistent with mild TYLER  Lowest oxygen saturation 74%  Apnea index in the supine positioning 12.8 events per hour  Apnea index on the right side 0 events per hour     Mean heart rate 67 bpm     Patient snored 95% of sleep time     Oxygen summary  Oxygen desaturation below 89% for 22 minutes    Interval History: Patient of annual compliance visit.  Currently on auto CPAP 5 to 15 cm.  Compliance 80% average daily use 6 hours 10 minutes.  AHI and leak within normal limits.  Her hiccups have improved significantly.  Goes to bed 9 gets up for 30 gets about 6+ hours of sleep more rested with the CPAP.  No tobacco alcohol or caffeine abuse.  Currently has a nasal pillow that fits well.  Supplies been adequate.  [place] 3 out of 24 within normal limits.    REVIEW OF SYSTEMS:   CARDIOVASCULAR: No chest pain, chest pressure or chest discomfort. No palpitations or edema.   RESPIRATORY: No shortness of breath, cough or sputum.   GASTROINTESTINAL: No anorexia, nausea, vomiting or diarrhea. No abdominal pain or blood.   HEMATOLOGIC: No bleeding or bruising.     Ventilator/Non-Invasive Ventilation Settings (From admission, onward)      None              Vital Signs  Heart Rate:  [77] 77  BP: (132)/(77) 132/77  [unfilled]  Flowsheet Rows      Flowsheet Row First Filed Value   Admission Height 170.2 cm (67.01\") Documented at 02/01/2024 0900   Admission Weight 150 kg (330 lb) Documented at 02/01/2024 0900            Physical Exam:  Patient is examined using the personal protective equipment as per guidelines from infection control for this particular patient as enacted.  Hand hygiene was performed before and after patient interaction.   General Appearance:    Alert, cooperative, in no acute distress.  Following simple commands  ENT " Mallampati between 3 and 4 no nasal congestion  Neck midline trachea, no thyromegaly   Lungs:     Clear to auscultation, respirations regular, even and                  unlabored    Heart:    Regular rhythm and normal rate, normal S1 and S2, no            murmur, no gallop, no rub, no click   Chest Wall:    No abnormalities observed   Abdomen:     Normal bowel sounds, no masses, no organomegaly, soft        nontender, nondistended, no guarding, no rebound                tenderness   Extremities:   Moves all extremities well, no edema, no cyanosis, no             redness  CNS no focal neurological deficits normal sensory exam  Skin no rashes no nodules  Musculoskeletal no cyanosis no clubbing normal range of motion     Results Review:                                          I reviewed the patient's new clinical results.  I personally viewed and interpreted the patient's chest x-ray.        Medication Review:       No current facility-administered medications for this visit.      ASSESSMENT:   Mild debbie  Hiccups  Asthma  Hypertension  Morbid obesity    PLAN:  Reviewed compliance download with the patient  Compliance AHI leak look excellent  Advised patient to continue using current auto CPAP at 5 to 15 cm  Hiccups have improved  Treatment of asthma currently stable  Weight loss encouraged  Treatment of underlying comorbidities  Follow-up in 1 year      Rubin Caicedo MD  02/01/24  09:13 EST

## 2024-02-19 ENCOUNTER — TELEPHONE (OUTPATIENT)
Dept: INTERNAL MEDICINE | Facility: CLINIC | Age: 39
End: 2024-02-19
Payer: COMMERCIAL

## 2024-02-19 RX ORDER — SEMAGLUTIDE 2.4 MG/.75ML
INJECTION, SOLUTION SUBCUTANEOUS
Qty: 12 ML | Refills: 0 | Status: SHIPPED | OUTPATIENT
Start: 2024-02-19

## 2024-03-05 ENCOUNTER — OFFICE VISIT (OUTPATIENT)
Dept: OBSTETRICS AND GYNECOLOGY | Facility: CLINIC | Age: 39
End: 2024-03-05
Payer: COMMERCIAL

## 2024-03-05 VITALS
BODY MASS INDEX: 45.99 KG/M2 | SYSTOLIC BLOOD PRESSURE: 143 MMHG | WEIGHT: 293 LBS | DIASTOLIC BLOOD PRESSURE: 80 MMHG | HEIGHT: 67 IN | HEART RATE: 89 BPM

## 2024-03-05 DIAGNOSIS — N92.3 SPOTTING BETWEEN MENSES: Primary | ICD-10-CM

## 2024-03-05 DIAGNOSIS — L70.9 ACNE, UNSPECIFIED ACNE TYPE: ICD-10-CM

## 2024-03-05 DIAGNOSIS — R63.5 WEIGHT GAIN: ICD-10-CM

## 2024-03-05 NOTE — PROGRESS NOTES
"GYN Problem/Follow Up Visit    Chief Complaint   Patient presents with    Wants to discuss pcos and irr menses           HPI  Leonarda Hogan is a 38 y.o. female, , who presents for spotting in between menses. States it just recently started. Denies pain. Has also noticed weight gain and acne. She is concerned about pcos. Does have monthly menses. Not on any bc. Not sexually active. She does report hx of uterine fibroids.        Additional OB/GYN History   Patient's last menstrual period was 2024 (approximate).  Current contraception: contraceptive methods: Abstinence  Allergies : Patient has no known allergies.     The additional following portions of the patient's history were reviewed and updated as appropriate: allergies, current medications, past family history, past medical history, past social history, past surgical history, and problem list.    Review of Systems    I have reviewed and agree with the HPI, ROS, and historical information as entered above. Elizabeth Lara, APRN    Objective   /80   Pulse 89   Ht 170.2 cm (67.01\")   Wt (!) 151 kg (333 lb)   LMP 2024 (Approximate)   BMI 52.14 kg/m²     Physical Exam  Vitals reviewed.   Neurological:      Mental Status: She is alert and oriented to person, place, and time.            Assessment and Plan    Diagnoses and all orders for this visit:    1. Spotting between menses (Primary)  -     17-Hydroxyprogesterone; Future  -     Comprehensive Metabolic Panel; Future  -     DHEA-Sulfate; Future  -     Testosterone, Bioavailable (M); Future  -     Insulin, Total; Future  -     Prolactin; Future  -     T4, Free; Future  -     TSH; Future  -     US Non-ob Transvaginal; Future  -     Hemoglobin A1c; Future    2. Weight gain  -     17-Hydroxyprogesterone; Future  -     Comprehensive Metabolic Panel; Future  -     DHEA-Sulfate; Future  -     Testosterone, Bioavailable (M); Future  -     Insulin, Total; Future  -     Prolactin; Future  -     T4, " Free; Future  -     TSH; Future  -     US Non-ob Transvaginal; Future  -     Hemoglobin A1c; Future    3. Acne, unspecified acne type  -     17-Hydroxyprogesterone; Future  -     Comprehensive Metabolic Panel; Future  -     DHEA-Sulfate; Future  -     Testosterone, Bioavailable (M); Future  -     Insulin, Total; Future  -     Prolactin; Future  -     T4, Free; Future  -     TSH; Future  -     US Non-ob Transvaginal; Future  -     Hemoglobin A1c; Future    Will check fasting pcos panel and pelvic u/s. Discussed that the spotting could be r/t fibroids/cysts/hormonal changes. Can consider bc to help with the sx. She will f/u after her u/s to discuss further.     Counseling:  She understands the importance of having the above orders performed in a timely fashion.  She is encouraged to review her results online and/or contact or office if she has questions.     Follow Up:  Return for fasting pcos panel, then lab and u/s f/u.      PAM De La O  03/05/2024

## 2024-03-08 ENCOUNTER — LAB (OUTPATIENT)
Dept: OBSTETRICS AND GYNECOLOGY | Facility: CLINIC | Age: 39
End: 2024-03-08
Payer: COMMERCIAL

## 2024-03-08 DIAGNOSIS — R63.5 WEIGHT GAIN: ICD-10-CM

## 2024-03-08 DIAGNOSIS — N92.3 SPOTTING BETWEEN MENSES: ICD-10-CM

## 2024-03-08 DIAGNOSIS — L70.9 ACNE, UNSPECIFIED ACNE TYPE: ICD-10-CM

## 2024-03-08 LAB
ALBUMIN SERPL-MCNC: 4.3 G/DL (ref 3.5–5.2)
ALBUMIN/GLOB SERPL: 1.3 G/DL
ALP SERPL-CCNC: 58 U/L (ref 39–117)
ALT SERPL W P-5'-P-CCNC: 13 U/L (ref 1–33)
ANION GAP SERPL CALCULATED.3IONS-SCNC: 13.8 MMOL/L (ref 5–15)
AST SERPL-CCNC: 17 U/L (ref 1–32)
BILIRUB SERPL-MCNC: 0.9 MG/DL (ref 0–1.2)
BUN SERPL-MCNC: 12 MG/DL (ref 6–20)
BUN/CREAT SERPL: 12.5 (ref 7–25)
CALCIUM SPEC-SCNC: 9.5 MG/DL (ref 8.6–10.5)
CHLORIDE SERPL-SCNC: 102 MMOL/L (ref 98–107)
CO2 SERPL-SCNC: 24.2 MMOL/L (ref 22–29)
CREAT SERPL-MCNC: 0.96 MG/DL (ref 0.57–1)
EGFRCR SERPLBLD CKD-EPI 2021: 77.8 ML/MIN/1.73
GLOBULIN UR ELPH-MCNC: 3.2 GM/DL
GLUCOSE SERPL-MCNC: 84 MG/DL (ref 65–99)
HBA1C MFR BLD: 5.6 % (ref 4.8–5.6)
POTASSIUM SERPL-SCNC: 4.4 MMOL/L (ref 3.5–5.2)
PROLACTIN SERPL-MCNC: 15.5 NG/ML (ref 4.79–23.3)
PROT SERPL-MCNC: 7.5 G/DL (ref 6–8.5)
SODIUM SERPL-SCNC: 140 MMOL/L (ref 136–145)
T4 FREE SERPL-MCNC: 1.32 NG/DL (ref 0.93–1.7)
TSH SERPL DL<=0.05 MIU/L-ACNC: 0.85 UIU/ML (ref 0.27–4.2)

## 2024-03-08 PROCEDURE — 84443 ASSAY THYROID STIM HORMONE: CPT | Performed by: OBSTETRICS & GYNECOLOGY

## 2024-03-08 PROCEDURE — 83036 HEMOGLOBIN GLYCOSYLATED A1C: CPT | Performed by: OBSTETRICS & GYNECOLOGY

## 2024-03-08 PROCEDURE — 84146 ASSAY OF PROLACTIN: CPT | Performed by: OBSTETRICS & GYNECOLOGY

## 2024-03-08 PROCEDURE — 84439 ASSAY OF FREE THYROXINE: CPT | Performed by: OBSTETRICS & GYNECOLOGY

## 2024-03-08 PROCEDURE — 80053 COMPREHEN METABOLIC PANEL: CPT | Performed by: OBSTETRICS & GYNECOLOGY

## 2024-03-09 LAB — DHEA-S SERPL-MCNC: 101 UG/DL (ref 57.3–279.2)

## 2024-03-10 LAB — INSULIN SERPL-ACNC: 28.8 UIU/ML (ref 2.6–24.9)

## 2024-03-13 LAB — 17OHP SERPL-MCNC: 16 NG/DL

## 2024-03-14 LAB
TESTOST SERPL-MCNC: 25 NG/DL
TESTOSTERONE.FREE+WB MFR SERPL: 8.5 %
TESTOSTERONE.FREE+WB SERPL-MCNC: 2.1 NG/DL

## 2024-03-19 ENCOUNTER — OFFICE VISIT (OUTPATIENT)
Dept: INTERNAL MEDICINE | Facility: CLINIC | Age: 39
End: 2024-03-19
Payer: COMMERCIAL

## 2024-03-19 VITALS
HEART RATE: 65 BPM | TEMPERATURE: 96.9 F | SYSTOLIC BLOOD PRESSURE: 122 MMHG | WEIGHT: 293 LBS | OXYGEN SATURATION: 98 % | HEIGHT: 67 IN | DIASTOLIC BLOOD PRESSURE: 78 MMHG | BODY MASS INDEX: 45.99 KG/M2

## 2024-03-19 DIAGNOSIS — Z80.3 FAMILY HISTORY OF BREAST CANCER IN SISTER: ICD-10-CM

## 2024-03-19 DIAGNOSIS — G93.2 IIH (IDIOPATHIC INTRACRANIAL HYPERTENSION): ICD-10-CM

## 2024-03-19 DIAGNOSIS — E78.5 HYPERLIPIDEMIA, UNSPECIFIED HYPERLIPIDEMIA TYPE: ICD-10-CM

## 2024-03-19 DIAGNOSIS — J45.20 MILD INTERMITTENT ASTHMA WITHOUT COMPLICATION: ICD-10-CM

## 2024-03-19 DIAGNOSIS — I10 ESSENTIAL HYPERTENSION: Primary | ICD-10-CM

## 2024-03-19 DIAGNOSIS — L70.0 ACNE VULGARIS: ICD-10-CM

## 2024-03-19 DIAGNOSIS — R73.03 PREDIABETES: ICD-10-CM

## 2024-03-19 DIAGNOSIS — J30.9 ALLERGIC RHINITIS, UNSPECIFIED SEASONALITY, UNSPECIFIED TRIGGER: ICD-10-CM

## 2024-03-19 DIAGNOSIS — Z12.31 VISIT FOR SCREENING MAMMOGRAM: ICD-10-CM

## 2024-03-19 DIAGNOSIS — E55.9 VITAMIN D DEFICIENCY: ICD-10-CM

## 2024-03-19 RX ORDER — FLUTICASONE PROPIONATE 50 MCG
2 SPRAY, SUSPENSION (ML) NASAL DAILY
Qty: 16 G | Refills: 1 | Status: SHIPPED | OUTPATIENT
Start: 2024-03-19

## 2024-03-19 RX ORDER — ATORVASTATIN CALCIUM 40 MG/1
40 TABLET, FILM COATED ORAL DAILY
Qty: 90 TABLET | Refills: 1 | Status: SHIPPED | OUTPATIENT
Start: 2024-03-19

## 2024-03-19 RX ORDER — LOSARTAN POTASSIUM 100 MG/1
100 TABLET ORAL DAILY
Qty: 90 TABLET | Refills: 1 | Status: SHIPPED | OUTPATIENT
Start: 2024-03-19

## 2024-03-19 RX ORDER — MONTELUKAST SODIUM 10 MG/1
10 TABLET ORAL NIGHTLY
Qty: 90 TABLET | Refills: 1 | Status: SHIPPED | OUTPATIENT
Start: 2024-03-19

## 2024-03-19 RX ORDER — ERGOCALCIFEROL 1.25 MG/1
50000 CAPSULE ORAL WEEKLY
Qty: 12 CAPSULE | Refills: 1 | Status: SHIPPED | OUTPATIENT
Start: 2024-03-19

## 2024-03-19 RX ORDER — CETIRIZINE HYDROCHLORIDE 10 MG/1
10 TABLET ORAL DAILY
Qty: 90 TABLET | Refills: 1 | Status: SHIPPED | OUTPATIENT
Start: 2024-03-19

## 2024-03-19 RX ORDER — AMLODIPINE BESYLATE 10 MG/1
10 TABLET ORAL DAILY
Qty: 90 TABLET | Refills: 1 | Status: SHIPPED | OUTPATIENT
Start: 2024-03-19

## 2024-03-19 NOTE — ASSESSMENT & PLAN NOTE
Will add Singular, continue Zyrtec and Flonase. She will consider referral to allergist based on response.

## 2024-03-19 NOTE — PROGRESS NOTES
"Chief Complaint  Hypertension (3 month f/u)    Subjective      Leonarda Hogan is a 39 y.o. female who presents to [facility]     HTN-  Managed with losartan and amlodipine. She does not check her blood pressure at home. Denies chest pain, blurry vision, headache, leg swelling.     Has been having spotting between her cycles. States this just started within the past month or so. Has been exercising a lot more. Scheduled for an ultrasound Friday through GYN.      Acne-  Continues spironolactone through dermatology.      Prediabetes-  Patient has been on Wegovy x 1 year, has been exercising consistently over the past month. Was concerned that she wasn't losing weight but has lost a little more than she realized. Most recent A1c improved to 5.6. Patient states she is going to c continue on her medication for now.      HLD-  Managed with statin. Well tolerated, denies leg cramping.  Most recent LDL 60.     Allergic rhinitis/asthma-  Continues Zyrtec and Flonase and as needed albuterol. Asthma flared by allergies. She does continue to have itching in her ears. She would like to postpone referral to allergist at this time.      Vitamin D deficiency-  Managed with daily supplement.     Idiopathic intracranial hypertension-  Patient has been off of Diamox since December. States her eyes have not been red or uncomfortable. Will go back in April to check her pressure.    PAP smear: 9/2023  Mammogram: Family history of breast cancer in sister diagnosed in her 30s  Influenza vaccination: Declines  COVID vaccination: Up to date      Objective   Vital Signs:   Vitals:    03/19/24 0837   BP: 122/78   BP Location: Left arm   Patient Position: Sitting   Cuff Size: Large Adult   Pulse: 65   Temp: 96.9 °F (36.1 °C)   TempSrc: Temporal   SpO2: 98%   Weight: (!) 148 kg (327 lb 3.2 oz)   Height: 170.2 cm (67.01\")     Body mass index is 51.23 kg/m².    Wt Readings from Last 3 Encounters: "   03/19/24 (!) 148 kg (327 lb 3.2 oz)   03/05/24 (!) 151 kg (333 lb)   02/01/24 (!) 150 kg (330 lb)     BP Readings from Last 3 Encounters:   03/19/24 122/78   03/05/24 143/80   02/01/24 132/77       Health Maintenance   Topic Date Due    COVID-19 Vaccine (4 - 2023-24 season) 03/21/2024 (Originally 9/1/2023)    INFLUENZA VACCINE  03/31/2024 (Originally 8/1/2023)    TDAP/TD VACCINES (1 - Tdap) 08/11/2024 (Originally 3/19/2004)    Pneumococcal Vaccine 0-64 (1 of 2 - PCV) 03/19/2025 (Originally 3/19/1991)    ANNUAL PHYSICAL  08/11/2024    LIPID PANEL  11/14/2024    BMI FOLLOWUP  02/19/2025    PAP SMEAR  09/18/2026    HEPATITIS C SCREENING  Completed       Physical Exam  Constitutional:       Appearance: Normal appearance.   HENT:      Head: Normocephalic and atraumatic.      Nose: Nose normal.      Mouth/Throat:      Mouth: Mucous membranes are moist.      Pharynx: Oropharynx is clear.   Eyes:      Extraocular Movements: Extraocular movements intact.      Conjunctiva/sclera: Conjunctivae normal.      Pupils: Pupils are equal, round, and reactive to light.   Neck:      Thyroid: No thyroid mass, thyromegaly or thyroid tenderness.   Cardiovascular:      Rate and Rhythm: Normal rate and regular rhythm.      Heart sounds: Normal heart sounds.   Pulmonary:      Effort: Pulmonary effort is normal.      Breath sounds: Normal breath sounds.   Skin:     General: Skin is warm and dry.   Neurological:      General: No focal deficit present.      Mental Status: She is alert and oriented to person, place, and time.   Psychiatric:         Mood and Affect: Mood normal.         Behavior: Behavior normal.         Thought Content: Thought content normal.          Result Review :  The following data was reviewed by: PAM Sherwood on 03/19/2024:  Common labs          9/11/2023    10:20 11/14/2023    08:36 3/8/2024    09:02   Common Labs   Glucose 80  72  84    BUN 7  9  12    Creatinine 0.87  0.86  0.96    Sodium 139  137  140     Potassium 4.4  4.1  4.4    Chloride 103  104  102    Calcium 10.1  9.8  9.5    Albumin 4.2  4.4  4.3    Total Bilirubin 0.6  0.6  0.9    Alkaline Phosphatase 72  71  58    AST (SGOT) 18  13  17    ALT (SGPT) 14  16  13    WBC  5.85     Hemoglobin  12.7     Hematocrit  38.5     Platelets  402     Total Cholesterol  141     Triglycerides  66     HDL Cholesterol  68     LDL Cholesterol   60     Hemoglobin A1C   5.60           Procedures          Assessment & Plan  Essential hypertension  Well controlled, continue losartan and amlodipine. Patient should monitor blood pressure at home and call or return to clinic with consistent elevations greater than 130/80. Reviewed recent labs from GYN.   Hyperlipidemia, unspecified hyperlipidemia type  Well controlled, continue statin. Most recent LDL 60. Lipid panel with labs.  IIH (idiopathic intracranial hypertension)  Continue to follow with ophthalmology as scheduled.   Prediabetes  A1c improved to 5.6, continue Wegovy.   Allergic rhinitis, unspecified seasonality, unspecified trigger  Will add Singular, continue Zyrtec and Flonase. She will consider referral to allergist based on response.  Mild intermittent asthma without complication  Well-controlled, continue as needed albuterol.  Singulair today as discussed.  Vitamin D deficiency  Continue vitamin D supplement, will repeat level with next labs.  Acne vulgaris  Continue to follow with dermatology for management of spironolactone.  Family history of breast cancer in sister  Mammogram ordered.  Visit for screening mammogram      Orders Placed This Encounter   Procedures    Mammo Screening Digital Tomosynthesis Bilateral With CAD     New Medications Ordered This Visit   Medications    vitamin D (ERGOCALCIFEROL) 1.25 MG (27683 UT) capsule capsule     Sig: Take 1 capsule by mouth 1 (One) Time Per Week.     Dispense:  12 capsule     Refill:  1    montelukast (Singulair) 10 MG tablet     Sig: Take 1 tablet by mouth Every Night.      Dispense:  90 tablet     Refill:  1    amLODIPine (NORVASC) 10 MG tablet     Sig: Take 1 tablet by mouth Daily.     Dispense:  90 tablet     Refill:  1    atorvastatin (LIPITOR) 40 MG tablet     Sig: Take 1 tablet by mouth Daily.     Dispense:  90 tablet     Refill:  1    cetirizine (zyrTEC) 10 MG tablet     Sig: Take 1 tablet by mouth Daily.     Dispense:  90 tablet     Refill:  1    fluticasone (FLONASE) 50 MCG/ACT nasal spray     Si sprays into the nostril(s) as directed by provider Daily.     Dispense:  16 g     Refill:  1    losartan (COZAAR) 100 MG tablet     Sig: Take 1 tablet by mouth Daily.     Dispense:  90 tablet     Refill:  1                    FOLLOW UP  Return in about 5 months (around 2024).  Patient was given instructions and counseling regarding her condition or for health maintenance advice. Please see specific information pulled into the AVS if appropriate.       PAM Sherwood  24  11:19 EDT    CURRENT & DISCONTINUED MEDICATIONS  Current Outpatient Medications   Medication Instructions    albuterol sulfate  (90 Base) MCG/ACT inhaler 2 puffs, Inhalation, Every 4 Hours PRN    amLODIPine (NORVASC) 10 mg, Oral, Daily    atorvastatin (LIPITOR) 40 mg, Oral, Daily    cetirizine (ZYRTEC) 10 mg, Oral, Daily    fluticasone (FLONASE) 50 MCG/ACT nasal spray 2 sprays, Nasal, Daily    losartan (COZAAR) 100 mg, Oral, Daily    montelukast (SINGULAIR) 10 mg, Oral, Nightly    spironolactone (ALDACTONE) 50 MG tablet Take 1 tablet by mouth once daily    vitamin D (ERGOCALCIFEROL) 50,000 Units, Oral, Weekly    Wegovy 2.4 MG/0.75ML solution auto-injector INJECT 2.4MG UNDER THE SKIN INTO THE APPROPRIATE AREA AS DIRECTED ONE TIME PER WEEK       Medications Discontinued During This Encounter   Medication Reason    vitamin D (ERGOCALCIFEROL) 1.25 MG (41885 UT) capsule capsule Reorder    acetaZOLAMIDE (DIAMOX) 250 MG tablet     hydroCHLOROthiazide (HYDRODIURIL) 25 MG tablet     hydroquinone  4 % cream     Arazlo 0.045 % lotion     amLODIPine (NORVASC) 10 MG tablet Reorder    atorvastatin (LIPITOR) 40 MG tablet Reorder    losartan (COZAAR) 100 MG tablet Reorder    cetirizine (zyrTEC) 10 MG tablet Reorder    fluticasone (FLONASE) 50 MCG/ACT nasal spray Reorder

## 2024-03-19 NOTE — ASSESSMENT & PLAN NOTE
Well controlled, continue losartan and amlodipine. Patient should monitor blood pressure at home and call or return to clinic with consistent elevations greater than 130/80. Reviewed recent labs from GYN.

## 2024-03-22 ENCOUNTER — HOSPITAL ENCOUNTER (OUTPATIENT)
Dept: ULTRASOUND IMAGING | Facility: HOSPITAL | Age: 39
Discharge: HOME | End: 2024-03-22
Admitting: OBSTETRICS & GYNECOLOGY
Payer: COMMERCIAL

## 2024-03-22 DIAGNOSIS — N92.3 SPOTTING BETWEEN MENSES: ICD-10-CM

## 2024-03-22 DIAGNOSIS — R63.5 WEIGHT GAIN: ICD-10-CM

## 2024-03-22 DIAGNOSIS — L70.9 ACNE, UNSPECIFIED ACNE TYPE: ICD-10-CM

## 2024-03-22 PROCEDURE — 76830 TRANSVAGINAL US NON-OB: CPT

## 2024-03-27 ENCOUNTER — OFFICE VISIT (OUTPATIENT)
Dept: OBSTETRICS AND GYNECOLOGY | Facility: CLINIC | Age: 39
End: 2024-03-27
Payer: COMMERCIAL

## 2024-03-27 VITALS
HEART RATE: 86 BPM | DIASTOLIC BLOOD PRESSURE: 72 MMHG | HEIGHT: 67 IN | SYSTOLIC BLOOD PRESSURE: 133 MMHG | BODY MASS INDEX: 45.99 KG/M2 | WEIGHT: 293 LBS

## 2024-03-27 DIAGNOSIS — E88.819 INSULIN RESISTANCE: ICD-10-CM

## 2024-03-27 DIAGNOSIS — D25.9 UTERINE LEIOMYOMA, UNSPECIFIED LOCATION: ICD-10-CM

## 2024-03-27 DIAGNOSIS — N92.3 SPOTTING BETWEEN MENSES: Primary | ICD-10-CM

## 2024-03-27 NOTE — PROGRESS NOTES
"GYN Problem/Follow Up Visit    Chief Complaint   Patient presents with    FOLLOW UP U/S           HPI  Leonarda Hogan is a 39 y.o. female, , who presents for lab and u/s f/u. Recently seen for spotting between menses, weight gain, and acne. No new concerns.       Additional OB/GYN History   Patient's last menstrual period was 2024.  Current contraception: contraceptive methods: None  Desires to: do not start contraception  Allergies : Patient has no known allergies.     The additional following portions of the patient's history were reviewed and updated as appropriate: allergies, current medications, past family history, past medical history, past social history, past surgical history, and problem list.    Review of Systems    I have reviewed and agree with the HPI, ROS, and historical information as entered above. Elizabeth Lara, APRN    Objective   /72   Pulse 86   Ht 170.2 cm (67.01\")   Wt (!) 151 kg (332 lb)   LMP 2024   BMI 51.98 kg/m²     Physical Exam  Vitals reviewed.   Neurological:      Mental Status: She is alert and oriented to person, place, and time.            Assessment and Plan    Diagnoses and all orders for this visit:    1. Spotting between menses (Primary)    2. Uterine leiomyoma, unspecified location    3. Insulin resistance    Reviewed labs from 3/8/24: normal except insulin 28.8. discussed insulin resistance and tx options. She is currently on wegovy and will speak to her pcp regarding tx options. Reviewed pelvic u/s from 3/22/24: multiple probable uterine fibroids. Ovaries not visualized. Discussed seeing gyn doc for an eval and pt desires. She declines any hormonal tx for the spotting/fibroids.     Counseling:  She understands the importance of having the above orders performed in a timely fashion.  She is encouraged to review her results online and/or contact or office if she has questions.     Follow Up:  Return for eval by gyn doc for aub/fibroids.      Elizabeth" PAM Lara  03/27/2024

## 2024-04-05 ENCOUNTER — HOSPITAL ENCOUNTER (OUTPATIENT)
Dept: MAMMOGRAPHY | Facility: HOSPITAL | Age: 39
Discharge: HOME | End: 2024-04-05
Admitting: NURSE PRACTITIONER
Payer: COMMERCIAL

## 2024-04-05 DIAGNOSIS — Z80.3 FAMILY HISTORY OF BREAST CANCER IN SISTER: ICD-10-CM

## 2024-04-05 DIAGNOSIS — Z12.31 VISIT FOR SCREENING MAMMOGRAM: ICD-10-CM

## 2024-04-05 PROCEDURE — 77067 SCR MAMMO BI INCL CAD: CPT

## 2024-04-05 PROCEDURE — 77063 BREAST TOMOSYNTHESIS BI: CPT

## 2024-04-10 NOTE — PROGRESS NOTES
"GYN Visit    Chief Complaint   Patient presents with    Fibroids       HPI:   39 y.o. with LMP 1024 here for abnormal uterine bleeding and fibroids.  Menses q mo x 5-6 days with 3 days heavy using overnight pads changing 5x per day.  Pt states in February she began spotting x 2-3 days after menses and she is concerned as this is new for her.  She had repeat sono due to fibroids and it did not show any change in the size.      History: PMHx, Meds, Allergies, PSHx, Social Hx, and POBHx all reviewed and updated.    PHYSICAL EXAM:  /85   Pulse 67   Ht 170.2 cm (67.01\")   Wt (!) 149 kg (329 lb)   LMP 04/10/2024 Comment: currently on menses, steady flow, lasting about 5-6 days  Breastfeeding No   BMI 51.51 kg/m²   General- NAD, alert and oriented, appropriate  Psych- Normal mood, good memory  Declines exam today due to bleeding        US Non-ob Transvaginal (2024 08:33)  US Pelvis Transvaginal Non OB (2022 18:01)     Latest Reference Range & Units 23 08:36   Hemoglobin 12.0 - 15.9 g/dL 12.7         Comprehensive Metabolic Panel (2024 09:02)  T4, Free (2024 09:02)    TSH (2024 09:02)    Hemoglobin A1c (2024 09:02)    ASSESSMENT AND PLAN:  Diagnoses and all orders for this visit:    1. Abnormal uterine bleeding (AUB) (Primary)    2. Fibroids    3. Essential hypertension    4. Other hyperlipidemia    5. Prediabetes    6. Morbid obesity with BMI of 50.0-59.9, adult    Pt morbidly obese with hypertension and diabetes. She now has some abnormal bleeding. I recommend endometrial biopsy as patient is at increased risk for uterine cancer.  Her fibroids appear stable and I believe they are benign fibroids and she does not have to have any procedure related to the fibroids at this time.  Pt also not a candidate for removal with BMI 51 unless medically indicated. Last hbg 12.7.        Follow Up:  Return in about 1 week (around 2024) for emb.          Tianna OLSON" DO Santos  04/12/2024    [facility]  [address] [place] [address]  Dept: [phone]  Dept Fax: [phone]  Loc: [phone]  Loc Fax: [phone]

## 2024-04-12 ENCOUNTER — OFFICE VISIT (OUTPATIENT)
Dept: OBSTETRICS AND GYNECOLOGY | Facility: CLINIC | Age: 39
End: 2024-04-12
Payer: COMMERCIAL

## 2024-04-12 VITALS
SYSTOLIC BLOOD PRESSURE: 127 MMHG | BODY MASS INDEX: 45.99 KG/M2 | DIASTOLIC BLOOD PRESSURE: 85 MMHG | HEIGHT: 67 IN | HEART RATE: 67 BPM | WEIGHT: 293 LBS

## 2024-04-12 DIAGNOSIS — D21.9 FIBROIDS: ICD-10-CM

## 2024-04-12 DIAGNOSIS — R73.03 PREDIABETES: ICD-10-CM

## 2024-04-12 DIAGNOSIS — N93.9 ABNORMAL UTERINE BLEEDING (AUB): Primary | ICD-10-CM

## 2024-04-12 DIAGNOSIS — I10 ESSENTIAL HYPERTENSION: ICD-10-CM

## 2024-04-12 DIAGNOSIS — E78.49 OTHER HYPERLIPIDEMIA: ICD-10-CM

## 2024-04-12 DIAGNOSIS — E66.01 MORBID OBESITY WITH BMI OF 50.0-59.9, ADULT: ICD-10-CM

## 2024-04-12 PROCEDURE — 99214 OFFICE O/P EST MOD 30 MIN: CPT | Performed by: OBSTETRICS & GYNECOLOGY

## 2024-04-19 NOTE — PROGRESS NOTES
"Endometrial Biopsy Procedure Note      CC:  Pt presents for Endometrial Bx  Chief Complaint   Patient presents with    Procedure     Embx       Social History     Substance and Sexual Activity   Sexual Activity Not Currently    Partners: Male    Birth control/protection: None       LMP: Patient's last menstrual period was 04/10/2024.     Bhcg: Negative UPT  Lab Results   Component Value Date    POCPREGUR Negative 04/22/2024      Consent signed: yes    Procedure reviewed in detail.  She understands the potential risks include, but are not limited to, pain, bleeding, uterine perforation and infection.  Her questions have been answered.      Subjective/HPI:  40 y/o G0 with hx abnormal uterine bleeding.  Pt seen 4/12/2024 and was on menses so appt for EMB rescheduled.  Pt with no new c/o today    Objective:  /87   Pulse 69   Ht 170.2 cm (67.01\")   Wt (!) 150 kg (331 lb)   LMP 04/10/2024 Comment: steady flow, lasting about 5-6 days  Breastfeeding No   BMI 51.83 kg/m²   External genitalia- Without lesion   Vulva/Vagina/Perineum- Without lesion  Cervix- Without lesion  Uterus- Normal size, shape & consistency.  Non tender, mobile., exam limited by body habitus  Betadine/Hibiclens x3.  Tenaculum placed.  Uterus sounded to 9cm.    EMBx performed without difficulty.    Tissue sent for pathology.    Patient tolerated the procedure well.  Chaperone present during pelvic exam.    US Pelvis Transvaginal Non OB (01/31/2022 18:01)    US Non-ob Transvaginal (03/22/2024 08:33)    Assessment and Plan:  Diagnoses and all orders for this visit:    1. Abnormal uterine bleeding (AUB) (Primary)  -     Tissue Pathology Exam    2. Pregnancy test negative  -     POC Pregnancy, Urine          Counseling:  Biopsy is ~94% sensitive, a negative biopsy is not 100% certain of no malignancy.   PRECAUTIONS - It is common to have bright red spotting and/or bleeding.  She should not be bleeding heavily.  She can use OTC pain relievers prn.  " She needs to return to office or ER (if after hours/weekends) if she has pelvic pain, bleeding > 1 pad/2 hours, discharge that has a bad vaginal odor or vaginal itching, fever > 101.5, or any other concerns.    Pt to call office if hasn't received results and recommended next steps in the next 7-10days.              Follow Up:  No follow-ups on file.        Tianna Graham DO  04/22/2024    [facility]  [address] [place] [address]  Dept: [phone]  Dept Fax: [phone]  Loc: [phone]  Loc Fax: [phone]

## 2024-04-22 ENCOUNTER — PROCEDURE VISIT (OUTPATIENT)
Dept: OBSTETRICS AND GYNECOLOGY | Facility: CLINIC | Age: 39
End: 2024-04-22
Payer: COMMERCIAL

## 2024-04-22 VITALS
BODY MASS INDEX: 45.99 KG/M2 | HEART RATE: 69 BPM | DIASTOLIC BLOOD PRESSURE: 87 MMHG | SYSTOLIC BLOOD PRESSURE: 120 MMHG | WEIGHT: 293 LBS | HEIGHT: 67 IN

## 2024-04-22 DIAGNOSIS — Z32.02 PREGNANCY TEST NEGATIVE: ICD-10-CM

## 2024-04-22 DIAGNOSIS — N93.9 ABNORMAL UTERINE BLEEDING (AUB): Primary | ICD-10-CM

## 2024-04-22 LAB
B-HCG UR QL: NEGATIVE
EXPIRATION DATE: NORMAL
INTERNAL NEGATIVE CONTROL: NORMAL
INTERNAL POSITIVE CONTROL: NORMAL
Lab: NORMAL

## 2024-04-22 PROCEDURE — 81025 URINE PREGNANCY TEST: CPT | Performed by: OBSTETRICS & GYNECOLOGY

## 2024-04-22 PROCEDURE — 58100 BIOPSY OF UTERUS LINING: CPT | Performed by: OBSTETRICS & GYNECOLOGY

## 2024-04-22 PROCEDURE — 88305 TISSUE EXAM BY PATHOLOGIST: CPT | Performed by: OBSTETRICS & GYNECOLOGY

## 2024-04-24 LAB
CYTO UR: NORMAL
LAB AP CASE REPORT: NORMAL
LAB AP CLINICAL INFORMATION: NORMAL
PATH REPORT.FINAL DX SPEC: NORMAL
PATH REPORT.GROSS SPEC: NORMAL

## 2024-04-30 ENCOUNTER — OFFICE VISIT (OUTPATIENT)
Dept: INTERNAL MEDICINE | Facility: CLINIC | Age: 39
End: 2024-04-30
Payer: COMMERCIAL

## 2024-04-30 VITALS
DIASTOLIC BLOOD PRESSURE: 80 MMHG | WEIGHT: 293 LBS | TEMPERATURE: 97.4 F | OXYGEN SATURATION: 99 % | BODY MASS INDEX: 45.99 KG/M2 | HEIGHT: 67 IN | SYSTOLIC BLOOD PRESSURE: 126 MMHG | HEART RATE: 72 BPM

## 2024-04-30 DIAGNOSIS — J30.9 ALLERGIC RHINITIS, UNSPECIFIED SEASONALITY, UNSPECIFIED TRIGGER: ICD-10-CM

## 2024-04-30 DIAGNOSIS — R73.03 PREDIABETES: Primary | ICD-10-CM

## 2024-04-30 PROCEDURE — 99213 OFFICE O/P EST LOW 20 MIN: CPT | Performed by: NURSE PRACTITIONER

## 2024-04-30 RX ORDER — HYDROQUINONE 40 MG/G
1 CREAM TOPICAL 2 TIMES DAILY
COMMUNITY
Start: 2024-04-05

## 2024-04-30 NOTE — PROGRESS NOTES
"Chief Complaint  Prediabetes    Subjective      Leonarda Hogan is a 39 y.o. female who presents to [facility]     Patient in clinic to discuss metformin.  States she was evaluated by GYN and diagnosed with insulin resistance, metformin was recommended.  Patient was curious if she could continue this with the Wegovy and other medications that she is taking.  She would be interested in starting this.  Has not been taking Wegovy over the past few weeks but does plan to restart this.    Allergic rhinitis-  Patient continues Singulair and Zyrtec.  Flonase did not seem to help her symptoms in the past.  Reports she continues to have itching in her ears.  She has decided she would like to be evaluated by the allergist.    Objective   Vital Signs:   Vitals:    04/30/24 0817   BP: 126/80   BP Location: Left arm   Patient Position: Sitting   Cuff Size: Large Adult   Pulse: 72   Temp: 97.4 °F (36.3 °C)   TempSrc: Temporal   SpO2: 99%   Weight: (!) 152 kg (335 lb 8 oz)   Height: 170.2 cm (67.01\")     Body mass index is 52.53 kg/m².    Wt Readings from Last 3 Encounters:   04/30/24 (!) 152 kg (335 lb 8 oz)   04/22/24 (!) 150 kg (331 lb)   04/12/24 (!) 149 kg (329 lb)     BP Readings from Last 3 Encounters:   04/30/24 126/80   04/22/24 120/87   04/12/24 127/85       Health Maintenance   Topic Date Due    COVID-19 Vaccine (4 - 2023-24 season) 09/01/2023    TDAP/TD VACCINES (1 - Tdap) 08/11/2024 (Originally 3/19/2004)    Pneumococcal Vaccine 0-64 (1 of 2 - PCV) 03/19/2025 (Originally 3/19/1991)    INFLUENZA VACCINE  08/01/2024    ANNUAL PHYSICAL  08/11/2024    BMI FOLLOWUP  08/11/2024    LIPID PANEL  11/14/2024    PAP SMEAR  09/18/2026    HEPATITIS C SCREENING  Completed       Physical Exam  Constitutional:       Appearance: Normal appearance.   HENT:      Head: Normocephalic and atraumatic.      Nose: Nose normal.      Mouth/Throat:      Mouth: Mucous membranes are moist.      " Pharynx: Oropharynx is clear.   Eyes:      Extraocular Movements: Extraocular movements intact.      Conjunctiva/sclera: Conjunctivae normal.      Pupils: Pupils are equal, round, and reactive to light.   Cardiovascular:      Rate and Rhythm: Normal rate and regular rhythm.      Heart sounds: Normal heart sounds.   Pulmonary:      Effort: Pulmonary effort is normal.      Breath sounds: Normal breath sounds.   Skin:     General: Skin is warm and dry.   Neurological:      General: No focal deficit present.      Mental Status: She is alert and oriented to person, place, and time.   Psychiatric:         Mood and Affect: Mood normal.         Behavior: Behavior normal.         Thought Content: Thought content normal.          Result Review :  The following data was reviewed by: PAM Sherwood on 04/30/2024:         Procedures          Assessment & Plan  Prediabetes  Patient will continue Wegovy and start metformin.  Discussed risk of more severe side effects with restarting at higher Wegovy dosage, she will notify clinic if she would like prescription for lower dosage but defers at this time.  Discussed potential side effects of metformin, including stomach upset.  She will wait to start metformin until on Wegovy x 1 month.  Will repeat A1c as scheduled in September or sooner if concerns arise.  Allergic rhinitis, unspecified seasonality, unspecified trigger  Continue Zyrtec and Singulair, referral to allergist for further evaluation.    Orders Placed This Encounter   Procedures    Ambulatory Referral to Allergy     New Medications Ordered This Visit   Medications    metFORMIN (GLUCOPHAGE) 500 MG tablet     Sig: Take 1 tablet by mouth 2 (Two) Times a Day With Meals for 30 days.     Dispense:  60 tablet     Refill:  0                    FOLLOW UP  Return for Next scheduled follow up or sooner if concerns arise.  Patient was given instructions and counseling regarding her condition or for health maintenance advice.  Please see specific information pulled into the AVS if appropriate.       Saira Saucedo, PAM  04/30/24  09:29 EDT    CURRENT & DISCONTINUED MEDICATIONS  Current Outpatient Medications   Medication Instructions    albuterol sulfate  (90 Base) MCG/ACT inhaler 2 puffs, Inhalation, Every 4 Hours PRN    amLODIPine (NORVASC) 10 mg, Oral, Daily    atorvastatin (LIPITOR) 40 mg, Oral, Daily    cetirizine (ZYRTEC) 10 mg, Oral, Daily    fluticasone (FLONASE) 50 MCG/ACT nasal spray 2 sprays, Nasal, Daily    hydroquinone 4 % cream 1 Application, Topical, 2 Times Daily    losartan (COZAAR) 100 mg, Oral, Daily    metFORMIN (GLUCOPHAGE) 500 mg, Oral, 2 Times Daily With Meals    montelukast (SINGULAIR) 10 mg, Oral, Nightly    spironolactone (ALDACTONE) 50 MG tablet Take 1 tablet by mouth once daily    vitamin D (ERGOCALCIFEROL) 50,000 Units, Oral, Weekly       Medications Discontinued During This Encounter   Medication Reason    Wegovy 2.4 MG/0.75ML solution auto-injector

## 2024-04-30 NOTE — ASSESSMENT & PLAN NOTE
Patient will continue Wegovy and start metformin.  Discussed risk of more severe side effects with restarting at higher Wegovy dosage, she will notify clinic if she would like prescription for lower dosage but defers at this time.  Discussed potential side effects of metformin, including stomach upset.  She will wait to start metformin until on Wegovy x 1 month.  Will repeat A1c as scheduled in September or sooner if concerns arise.

## 2024-05-01 RX ORDER — PROGESTERONE 200 MG/1
200 CAPSULE ORAL NIGHTLY
Qty: 30 CAPSULE | Refills: 1 | Status: SHIPPED | OUTPATIENT
Start: 2024-05-01

## 2024-05-03 ENCOUNTER — TELEPHONE (OUTPATIENT)
Dept: INTERNAL MEDICINE | Facility: CLINIC | Age: 39
End: 2024-05-03
Payer: COMMERCIAL

## 2024-05-06 RX ORDER — SEMAGLUTIDE 0.25 MG/.5ML
0.25 INJECTION, SOLUTION SUBCUTANEOUS WEEKLY
Qty: 2 ML | Refills: 1 | Status: SHIPPED | OUTPATIENT
Start: 2024-05-06 | End: 2024-06-05

## 2024-06-24 RX ORDER — SEMAGLUTIDE 0.5 MG/.5ML
0.5 INJECTION, SOLUTION SUBCUTANEOUS WEEKLY
Qty: 6 ML | Refills: 1 | Status: SHIPPED | OUTPATIENT
Start: 2024-06-24

## 2024-08-05 ENCOUNTER — PRIOR AUTHORIZATION (OUTPATIENT)
Dept: INTERNAL MEDICINE | Facility: CLINIC | Age: 39
End: 2024-08-05
Payer: COMMERCIAL

## 2024-08-05 NOTE — TELEPHONE ENCOUNTER
Wegovy 0.5MG/0.5ML auto-injectors    Your PA request has been approved. Additional information will be provided in the approval communication. (Message 0740). Authorization Expiration Date: August 5, 2025.

## 2024-08-28 RX ORDER — ERGOCALCIFEROL 1.25 MG/1
50000 CAPSULE, LIQUID FILLED ORAL WEEKLY
Qty: 12 CAPSULE | Refills: 0 | Status: SHIPPED | OUTPATIENT
Start: 2024-08-28

## 2024-09-20 RX ORDER — LOSARTAN POTASSIUM 100 MG/1
100 TABLET ORAL DAILY
Qty: 90 TABLET | Refills: 0 | Status: SHIPPED | OUTPATIENT
Start: 2024-09-20

## 2024-10-01 ENCOUNTER — OFFICE VISIT (OUTPATIENT)
Dept: OBSTETRICS AND GYNECOLOGY | Facility: CLINIC | Age: 39
End: 2024-10-01
Payer: COMMERCIAL

## 2024-10-01 VITALS
HEIGHT: 67 IN | WEIGHT: 293 LBS | SYSTOLIC BLOOD PRESSURE: 131 MMHG | BODY MASS INDEX: 45.99 KG/M2 | HEART RATE: 78 BPM | DIASTOLIC BLOOD PRESSURE: 81 MMHG

## 2024-10-01 DIAGNOSIS — Z01.419 ENCOUNTER FOR GYNECOLOGICAL EXAMINATION WITHOUT ABNORMAL FINDING: Primary | ICD-10-CM

## 2024-10-01 PROCEDURE — G0123 SCREEN CERV/VAG THIN LAYER: HCPCS | Performed by: OBSTETRICS & GYNECOLOGY

## 2024-10-01 NOTE — PROGRESS NOTES
"Well Woman Visit    CC: Annual well woman exam       HPI:   39 y.o. Contraception or HRT: Contraception:  Abstinence  Menses:   q mon, lasts 7 days, changes products q 2hrs on heaviest days.   Pain:  Moderate, not too bad OTC meds do NOT help  Incontinence concerns: No  Hx of abnormal pap:  Yes  Pt has no complaints today.      History: PMHx, Meds, Allergies, PSHx, Social Hx, and POBHx all reviewed and updated.      PHYSICAL EXAM:  /81   Pulse 78   Ht 170.2 cm (67.01\")   Wt (!) 152 kg (334 lb)   LMP 2024 (Approximate)   BMI 52.30 kg/m²   General- NAD, alert and oriented, appropriate  Psych- Normal mood, good memory  Neck- No masses, no thyroid enlargement  CV- Regular rhythm, no murnurs  Resp- CTA to bases, no wheezes  Abdomen- Soft, non distended, non tender, no masses    Breast left-  Bilaterally symmetrical, no masses, non tender, no nipple discharge  Breast right- Bilaterally symmetrical, no masses, non tender, no nipple discharge    External genitalia- Normal female, no lesions  Urethra/meatus- Normal, no masses, non tender, no prolapse  Bladder- Normal, no masses, non tender, no prolapse  Vagina- Normal, no atrophy, no lesions, no discharge, no prolapse  Cvx- Normal, no lesions, no discharge, No cervical motion tenderness  Uterus- Normal size, shape & consistency.  Non tender, mobile.  Adnexa- No mass, non tender, Difficult to palpate  Anus/Rectum/Perineum- Not performed    Lymphatic- No palpable neck, axillary, or groin nodes  Ext- No edema, no cyanosis    Skin- No lesions, no rashes, no acanthosis nigricans        ASSESSMENT and PLAN:  WWE    Diagnoses and all orders for this visit:    1. Encounter for gynecological examination without abnormal finding (Primary)  -     IGP,rfx Aptima HPV All Pth    Declines bc. Desires to continue with yearly paps for now.     Counseling:     Track menses, RTO IF <q21d, >7d long, or heavy    Domestic violence/abuse screen: negative    Depression " screen: no SI    Preventative:   BREAST HEALTH- Monthly self breast exam importance and how to reviewed. MMG and/or MRI (prn) reviewed per society guidelines and her individual history. Mammo/MRI screen: Already up to date.  CERVICAL CANCER Screening- Reviewed current ASCCP guidelines for screening w and wo cotest HPV, age specific.  Screen: Updated today.  COLON CANCER Screening- Reviewed current medical society guidelines and options.  Colonoscopy screen:  Not medically needed.  SEXUAL HEALTH: Declines STD screening.  VACCINATIONS Recommended: Flu annually, Gardisil/HPV vaccine 9-25yo, up to 44yo.  Importance discussed, risk being unvaccinated reviewed.  Questions answered  Smoking status- NON SMOKER.  Importance of avoiding second hand smoke.  Myriad: Counseled and evaluated for hereditary cancer testing Testing accepted Information and Fhx will be sent to genetic counselor to review and discuss with patient options for testing if she qualifies  Gardasil status: completed.      She understands the importance of having any ordered tests to be performed in a timely fashion.  She is encouraged to review her results online and/or contact or office if she has questions.     Follow Up:  Return in about 1 year (around 10/1/2025) for Annual physical.      Elizabeth Lara, APRN  10/01/2024

## 2024-10-08 LAB
CONV .: NORMAL
CYTOLOGIST CVX/VAG CYTO: NORMAL
CYTOLOGY CVX/VAG DOC CYTO: NORMAL
CYTOLOGY CVX/VAG DOC THIN PREP: NORMAL
DX ICD CODE: NORMAL
Lab: NORMAL
OTHER STN SPEC: NORMAL
STAT OF ADQ CVX/VAG CYTO-IMP: NORMAL

## 2024-10-22 ENCOUNTER — OFFICE VISIT (OUTPATIENT)
Dept: INTERNAL MEDICINE | Facility: CLINIC | Age: 39
End: 2024-10-22
Payer: COMMERCIAL

## 2024-10-22 VITALS
WEIGHT: 293 LBS | HEIGHT: 67 IN | BODY MASS INDEX: 45.99 KG/M2 | DIASTOLIC BLOOD PRESSURE: 78 MMHG | OXYGEN SATURATION: 98 % | TEMPERATURE: 97 F | SYSTOLIC BLOOD PRESSURE: 132 MMHG | HEART RATE: 74 BPM

## 2024-10-22 DIAGNOSIS — R73.03 PREDIABETES: ICD-10-CM

## 2024-10-22 DIAGNOSIS — E78.5 HYPERLIPIDEMIA, UNSPECIFIED HYPERLIPIDEMIA TYPE: ICD-10-CM

## 2024-10-22 DIAGNOSIS — Z00.00 ANNUAL PHYSICAL EXAM: Primary | ICD-10-CM

## 2024-10-22 DIAGNOSIS — Z12.31 VISIT FOR SCREENING MAMMOGRAM: ICD-10-CM

## 2024-10-22 DIAGNOSIS — J30.9 ALLERGIC RHINITIS, UNSPECIFIED SEASONALITY, UNSPECIFIED TRIGGER: ICD-10-CM

## 2024-10-22 DIAGNOSIS — G93.2 IIH (IDIOPATHIC INTRACRANIAL HYPERTENSION): ICD-10-CM

## 2024-10-22 DIAGNOSIS — E61.1 IRON DEFICIENCY: ICD-10-CM

## 2024-10-22 DIAGNOSIS — I10 ESSENTIAL HYPERTENSION: ICD-10-CM

## 2024-10-22 DIAGNOSIS — G47.33 OSA (OBSTRUCTIVE SLEEP APNEA): ICD-10-CM

## 2024-10-22 DIAGNOSIS — R53.83 FATIGUE, UNSPECIFIED TYPE: ICD-10-CM

## 2024-10-22 DIAGNOSIS — E55.9 VITAMIN D DEFICIENCY: ICD-10-CM

## 2024-10-22 DIAGNOSIS — L70.0 ACNE VULGARIS: ICD-10-CM

## 2024-10-22 LAB
25(OH)D3 SERPL-MCNC: 49.1 NG/ML (ref 30–100)
ALBUMIN SERPL-MCNC: 3.9 G/DL (ref 3.5–5.2)
ALBUMIN/GLOB SERPL: 1.1 G/DL
ALP SERPL-CCNC: 69 U/L (ref 39–117)
ALT SERPL W P-5'-P-CCNC: 12 U/L (ref 1–33)
ANION GAP SERPL CALCULATED.3IONS-SCNC: 8.2 MMOL/L (ref 5–15)
AST SERPL-CCNC: 16 U/L (ref 1–32)
BASOPHILS # BLD AUTO: 0.03 10*3/MM3 (ref 0–0.2)
BASOPHILS NFR BLD AUTO: 0.6 % (ref 0–1.5)
BILIRUB SERPL-MCNC: 0.8 MG/DL (ref 0–1.2)
BUN SERPL-MCNC: 9 MG/DL (ref 6–20)
BUN/CREAT SERPL: 11.4 (ref 7–25)
CALCIUM SPEC-SCNC: 9.4 MG/DL (ref 8.6–10.5)
CHLORIDE SERPL-SCNC: 105 MMOL/L (ref 98–107)
CHOLEST SERPL-MCNC: 139 MG/DL (ref 0–200)
CO2 SERPL-SCNC: 25.8 MMOL/L (ref 22–29)
CREAT SERPL-MCNC: 0.79 MG/DL (ref 0.57–1)
DEPRECATED RDW RBC AUTO: 41.8 FL (ref 37–54)
EGFRCR SERPLBLD CKD-EPI 2021: 97.7 ML/MIN/1.73
EOSINOPHIL # BLD AUTO: 0.06 10*3/MM3 (ref 0–0.4)
EOSINOPHIL NFR BLD AUTO: 1.1 % (ref 0.3–6.2)
ERYTHROCYTE [DISTWIDTH] IN BLOOD BY AUTOMATED COUNT: 15 % (ref 12.3–15.4)
FERRITIN SERPL-MCNC: 20.5 NG/ML (ref 13–150)
FOLATE SERPL-MCNC: 5.51 NG/ML (ref 4.78–24.2)
GLOBULIN UR ELPH-MCNC: 3.5 GM/DL
GLUCOSE SERPL-MCNC: 79 MG/DL (ref 65–99)
HBA1C MFR BLD: 5.7 % (ref 4.8–5.6)
HCT VFR BLD AUTO: 36.4 % (ref 34–46.6)
HDLC SERPL-MCNC: 67 MG/DL (ref 40–60)
HGB BLD-MCNC: 11.2 G/DL (ref 12–15.9)
IMM GRANULOCYTES # BLD AUTO: 0.01 10*3/MM3 (ref 0–0.05)
IMM GRANULOCYTES NFR BLD AUTO: 0.2 % (ref 0–0.5)
IRON 24H UR-MRATE: 51 MCG/DL (ref 37–145)
IRON SATN MFR SERPL: 11 % (ref 20–50)
LDLC SERPL CALC-MCNC: 61 MG/DL (ref 0–100)
LDLC/HDLC SERPL: 0.92 {RATIO}
LYMPHOCYTES # BLD AUTO: 2.39 10*3/MM3 (ref 0.7–3.1)
LYMPHOCYTES NFR BLD AUTO: 44.6 % (ref 19.6–45.3)
MCH RBC QN AUTO: 23.9 PG (ref 26.6–33)
MCHC RBC AUTO-ENTMCNC: 30.8 G/DL (ref 31.5–35.7)
MCV RBC AUTO: 77.8 FL (ref 79–97)
MONOCYTES # BLD AUTO: 0.33 10*3/MM3 (ref 0.1–0.9)
MONOCYTES NFR BLD AUTO: 6.2 % (ref 5–12)
NEUTROPHILS NFR BLD AUTO: 2.54 10*3/MM3 (ref 1.7–7)
NEUTROPHILS NFR BLD AUTO: 47.3 % (ref 42.7–76)
NRBC BLD AUTO-RTO: 0 /100 WBC (ref 0–0.2)
PLATELET # BLD AUTO: 419 10*3/MM3 (ref 140–450)
PMV BLD AUTO: 11 FL (ref 6–12)
POTASSIUM SERPL-SCNC: 4.2 MMOL/L (ref 3.5–5.2)
PROT SERPL-MCNC: 7.4 G/DL (ref 6–8.5)
RBC # BLD AUTO: 4.68 10*6/MM3 (ref 3.77–5.28)
SODIUM SERPL-SCNC: 139 MMOL/L (ref 136–145)
T4 FREE SERPL-MCNC: 1.3 NG/DL (ref 0.92–1.68)
TIBC SERPL-MCNC: 478 MCG/DL (ref 298–536)
TRANSFERRIN SERPL-MCNC: 321 MG/DL (ref 200–360)
TRIGL SERPL-MCNC: 52 MG/DL (ref 0–150)
TSH SERPL DL<=0.05 MIU/L-ACNC: 0.95 UIU/ML (ref 0.27–4.2)
VIT B12 BLD-MCNC: 473 PG/ML (ref 211–946)
VLDLC SERPL-MCNC: 11 MG/DL (ref 5–40)
WBC NRBC COR # BLD AUTO: 5.36 10*3/MM3 (ref 3.4–10.8)

## 2024-10-22 PROCEDURE — 82306 VITAMIN D 25 HYDROXY: CPT | Performed by: NURSE PRACTITIONER

## 2024-10-22 PROCEDURE — 80061 LIPID PANEL: CPT | Performed by: NURSE PRACTITIONER

## 2024-10-22 PROCEDURE — 84439 ASSAY OF FREE THYROXINE: CPT | Performed by: NURSE PRACTITIONER

## 2024-10-22 PROCEDURE — 82746 ASSAY OF FOLIC ACID SERUM: CPT | Performed by: NURSE PRACTITIONER

## 2024-10-22 PROCEDURE — 83540 ASSAY OF IRON: CPT | Performed by: NURSE PRACTITIONER

## 2024-10-22 PROCEDURE — 84466 ASSAY OF TRANSFERRIN: CPT | Performed by: NURSE PRACTITIONER

## 2024-10-22 PROCEDURE — 83036 HEMOGLOBIN GLYCOSYLATED A1C: CPT | Performed by: NURSE PRACTITIONER

## 2024-10-22 PROCEDURE — 82607 VITAMIN B-12: CPT | Performed by: NURSE PRACTITIONER

## 2024-10-22 PROCEDURE — 99395 PREV VISIT EST AGE 18-39: CPT | Performed by: NURSE PRACTITIONER

## 2024-10-22 PROCEDURE — 82728 ASSAY OF FERRITIN: CPT | Performed by: NURSE PRACTITIONER

## 2024-10-22 PROCEDURE — 80050 GENERAL HEALTH PANEL: CPT | Performed by: NURSE PRACTITIONER

## 2024-10-22 RX ORDER — EPINEPHRINE 0.3 MG/.3ML
0.3 INJECTION SUBCUTANEOUS ONCE
COMMUNITY

## 2024-10-22 RX ORDER — SEMAGLUTIDE 2.4 MG/.75ML
2.4 INJECTION, SOLUTION SUBCUTANEOUS WEEKLY
COMMUNITY

## 2024-10-22 NOTE — ASSESSMENT & PLAN NOTE
Repeat A1c today.  Continue metformin and Wegovy.  Patient will notify clinic if she would like to decrease Wegovy dosage due to side effects.

## 2024-10-22 NOTE — ASSESSMENT & PLAN NOTE
Mild elevation today, continue losartan and amlodipine.  She will monitor blood pressure and notify clinic with consistent elevations greater than 130/80's.  Could consider medication adjustments in the future if needed.  Labs today.

## 2024-10-22 NOTE — PROGRESS NOTES
"Chief Complaint  Annual Exam (Physical )    Subjective      Leonarda Hogan is a 39 y.o. female who presents to [facility]     PAP: 2024  Mammogram: Family history of breast cancer in sister  Hepatitis C screening: Completed   Colonoscopy: Denies family history of colon cancer  Influenza vaccination: Declines   COVID19 vaccination: Declines   Eye exam: Scheduled    Dental exam: Scheduled      Annual physical exam-  Family history of heart attack in father. Denies chest pain, blurry vision, headache, leg swelling, shortness of breath, seizure activity.     Prediabetes-  Most recent A1c improved to 5.6. Patient managed with Wegovy, also takes metformin.  She is considering decreasing dosage of Wegovy, feels that she has bloating at the higher dosage.  She will consider adjustments when her refill is due.    HTN-  Managed with amlodipine.  She is not taking her blood pressure routinely at home.    HLD-  Continues statin. Most recent LDL 60.    Allergic rhinitis-  Managed with Zyrtec, Singulair and Flonase.  Admits she does not take these consistently, more on an as-needed basis.  Does continue to have issues with her ears.    Vitamin D deficiency-  Continues supplement.     Acne-  Managed with spironolactone through dermatology. Patient reports she has been off of the medication most of the summer, plans to restart because she has noticed more breakouts.     IIH-  Stopped Diamox in May, has a follow up in December.     Objective   Vital Signs:   Vitals:    10/22/24 0951   BP: 132/78   BP Location: Right arm   Patient Position: Sitting   Cuff Size: Large Adult   Pulse: 74   Temp: 97 °F (36.1 °C)   TempSrc: Temporal   SpO2: 98%   Weight: (!) 149 kg (328 lb 6.4 oz)   Height: 170.2 cm (67.01\")     Body mass index is 51.42 kg/m².    Wt Readings from Last 3 Encounters:   10/22/24 (!) 149 kg (328 lb 6.4 oz)   10/01/24 (!) 152 kg (334 lb)   04/30/24 (!) 152 kg (335 lb 8 oz) "     BP Readings from Last 3 Encounters:   10/22/24 132/78   10/01/24 131/81   04/30/24 126/80       Health Maintenance   Topic Date Due    ANNUAL PHYSICAL  08/11/2024    LIPID PANEL  11/14/2024    COVID-19 Vaccine (4 - 2023-24 season) 10/24/2024 (Originally 9/1/2024)    Pneumococcal Vaccine 0-64 (1 of 2 - PCV) 03/19/2025 (Originally 3/19/1991)    INFLUENZA VACCINE  03/31/2025 (Originally 8/1/2024)    TDAP/TD VACCINES (1 - Tdap) 10/22/2025 (Originally 3/19/2004)    BMI FOLLOWUP  10/22/2025    PAP SMEAR  10/01/2027    HEPATITIS C SCREENING  Completed       Physical Exam  Constitutional:       Appearance: Normal appearance.   HENT:      Head: Normocephalic and atraumatic.      Nose: Nose normal.      Mouth/Throat:      Mouth: Mucous membranes are moist.      Pharynx: Oropharynx is clear.   Eyes:      Extraocular Movements: Extraocular movements intact.      Conjunctiva/sclera: Conjunctivae normal.      Pupils: Pupils are equal, round, and reactive to light.   Neck:      Thyroid: No thyroid mass, thyromegaly or thyroid tenderness.   Cardiovascular:      Rate and Rhythm: Normal rate and regular rhythm.      Heart sounds: Normal heart sounds.   Pulmonary:      Effort: Pulmonary effort is normal.      Breath sounds: Normal breath sounds.   Skin:     General: Skin is warm and dry.   Neurological:      General: No focal deficit present.      Mental Status: She is alert and oriented to person, place, and time.   Psychiatric:         Mood and Affect: Mood normal.         Behavior: Behavior normal.         Thought Content: Thought content normal.          Result Review :  The following data was reviewed by: PAM Sherwood on 10/22/2024:         Procedures          Assessment & Plan  Annual physical exam  Basic labs in clinic today. Encouraged routine dental and eye exams. Discussed age appropriate immunizations, screenings. Age appropriate handout provided, including information on nutrition and physical  activity.  Prediabetes  Repeat A1c today.  Continue metformin and Wegovy.  Patient will notify clinic if she would like to decrease Wegovy dosage due to side effects.  Essential hypertension  Mild elevation today, continue losartan and amlodipine.  She will monitor blood pressure and notify clinic with consistent elevations greater than 130/80's.  Could consider medication adjustments in the future if needed.  Labs today.  Hyperlipidemia, unspecified hyperlipidemia type  Continue statin, repeat lipid panel today.  Vitamin D deficiency  Continue supplement, vitamin D level with labs.  IIH (idiopathic intracranial hypertension)  She will continue to follow with ophthalmology as scheduled.  Acne vulgaris  Patient to follow with dermatology.  Iron deficiency  History of deficiency, iron studies today.  Fatigue, unspecified type  Will check labs today.  TYLER (obstructive sleep apnea)  Continue CPAP.  Visit for screening mammogram  Mammogram ordered for 2025.  Allergic rhinitis, unspecified seasonality, unspecified trigger  Continue Flonase, Zyrtec, Singulair.    Orders Placed This Encounter   Procedures    Comprehensive Metabolic Panel    Hemoglobin A1c    Lipid Panel    TSH    T4, Free    Vitamin D,25-Hydroxy    Vitamin B12 & Folate    Ferritin    Iron Profile    CBC Auto Differential    CBC & Differential            FOLLOW UP  Return in about 6 months (around 4/22/2025).  Patient was given instructions and counseling regarding her condition or for health maintenance advice. Please see specific information pulled into the AVS if appropriate.       PAM Sherwood  10/22/24  11:01 EDT    CURRENT & DISCONTINUED MEDICATIONS  Current Outpatient Medications   Medication Instructions    albuterol sulfate  (90 Base) MCG/ACT inhaler 2 puffs, Inhalation, Every 4 Hours PRN    amLODIPine (NORVASC) 10 mg, Oral, Daily    atorvastatin (LIPITOR) 40 mg, Oral, Daily    cetirizine (ZYRTEC) 10 mg, Oral, Daily    EPINEPHrine  (EPIPEN) 0.3 mg, Intramuscular, Once    fluticasone (FLONASE) 50 MCG/ACT nasal spray 2 sprays, Nasal, Daily    hydroquinone 4 % cream 1 Application, 2 Times Daily    losartan (COZAAR) 100 mg, Oral, Daily    metFORMIN (GLUCOPHAGE) 500 mg, Oral, 2 Times Daily With Meals    montelukast (SINGULAIR) 10 mg, Oral, Nightly    spironolactone (ALDACTONE) 50 MG tablet Take 1 tablet by mouth once daily    vitamin D (ERGOCALCIFEROL) 50,000 Units, Oral, Weekly    Wegovy 2.4 mg, Subcutaneous, Weekly       Medications Discontinued During This Encounter   Medication Reason    Progesterone (Prometrium) 200 MG capsule     Semaglutide-Weight Management (Wegovy) 0.5 MG/0.5ML solution auto-injector

## 2024-10-23 DIAGNOSIS — D64.9 ANEMIA, UNSPECIFIED TYPE: Primary | ICD-10-CM

## 2024-10-29 NOTE — TELEPHONE ENCOUNTER
Caller: Leonarda Hogan    Relationship: Self    Best call back number: [phone]    Requested Prescriptions:   Requested Prescriptions     Pending Prescriptions Disp Refills    metFORMIN (GLUCOPHAGE) 500 MG tablet 60 tablet 0     Sig: Take 1 tablet by mouth 2 (Two) Times a Day With Meals for 30 days.        Pharmacy where request should be sent: [address] [phone] [facility] [phone] [facility]     Last office visit with prescribing clinician: 10/22/2024   Last telemedicine visit with prescribing clinician: Visit date not found   Next office visit with prescribing clinician: 4/22/2025     Additional details provided by patient: 4 OR 5 DAYS LEFT     Does the patient have less than a 3 day supply:  [] Yes  [x] No    Would you like a call back once the refill request has been completed: [] Yes [] No    If the office needs to give you a call back, can they leave a voicemail: [] Yes [] No    Corie Rojo Rep   10/29/24 15:58 EDT

## 2024-10-29 NOTE — TELEPHONE ENCOUNTER
Caller: Leonarda Hogan    Relationship: Self    Best call back number: [phone]     Which medication are you concerned about: Semaglutide-Weight Management (Wegovy) 2.4 MG/0.75ML solution auto-injector     Who prescribed you this medication: Saira Saucedo APRN     When did you start taking this medication: 3 WEEK AGO     What are your concerns: PATIENT STATED MEDICATION IS TO STRONG AND MAKE HER FEEL BLOATED AND REQUESTING A LOWER DOSAGE.     [address] [phone] [facility] [phone]  [phone]

## 2024-11-22 RX ORDER — ERGOCALCIFEROL 1.25 MG/1
50000 CAPSULE, LIQUID FILLED ORAL WEEKLY
Qty: 12 CAPSULE | Refills: 0 | Status: SHIPPED | OUTPATIENT
Start: 2024-11-22

## 2024-12-02 ENCOUNTER — TELEPHONE (OUTPATIENT)
Dept: INTERNAL MEDICINE | Facility: CLINIC | Age: 39
End: 2024-12-02
Payer: COMMERCIAL

## 2024-12-02 NOTE — TELEPHONE ENCOUNTER
Caller: Leonarda Hogan    Relationship to patient: Self    Best call back number: [phone]     Chief complaint: 6 WEEK FOLLOW UP, BLOOD WORK    Type of visit: OFFICE VISIT    Requested date: PRIOR TO 01.06.2025     If rescheduling, when is the original appointment: 12.03.2024     Additional notes: PATIENT NEEDED TO RESCHEDULE APPOINTMENT ON 12.03.2024 DUE TO HAVING CLASS AT THAT TIME. PATIENT NEEDING APPOINTMENT CHANGED BUT NEEDING APPOINTMENT SOONER THAN 01.06.2025

## 2024-12-11 ENCOUNTER — OFFICE VISIT (OUTPATIENT)
Dept: INTERNAL MEDICINE | Facility: CLINIC | Age: 39
End: 2024-12-11
Payer: COMMERCIAL

## 2024-12-11 VITALS
OXYGEN SATURATION: 100 % | SYSTOLIC BLOOD PRESSURE: 144 MMHG | BODY MASS INDEX: 45.99 KG/M2 | DIASTOLIC BLOOD PRESSURE: 84 MMHG | WEIGHT: 293 LBS | TEMPERATURE: 97.6 F | HEART RATE: 80 BPM | RESPIRATION RATE: 16 BRPM | HEIGHT: 67 IN

## 2024-12-11 DIAGNOSIS — I10 ESSENTIAL HYPERTENSION: ICD-10-CM

## 2024-12-11 DIAGNOSIS — E66.813 CLASS 3 SEVERE OBESITY WITHOUT SERIOUS COMORBIDITY WITH BODY MASS INDEX (BMI) OF 50.0 TO 59.9 IN ADULT, UNSPECIFIED OBESITY TYPE: ICD-10-CM

## 2024-12-11 DIAGNOSIS — E66.01 CLASS 3 SEVERE OBESITY WITHOUT SERIOUS COMORBIDITY WITH BODY MASS INDEX (BMI) OF 50.0 TO 59.9 IN ADULT, UNSPECIFIED OBESITY TYPE: ICD-10-CM

## 2024-12-11 DIAGNOSIS — D64.9 ANEMIA, UNSPECIFIED TYPE: Primary | ICD-10-CM

## 2024-12-11 LAB
BASOPHILS # BLD AUTO: 0.02 10*3/MM3 (ref 0–0.2)
BASOPHILS NFR BLD AUTO: 0.4 % (ref 0–1.5)
DEPRECATED RDW RBC AUTO: 45.9 FL (ref 37–54)
EOSINOPHIL # BLD AUTO: 0.08 10*3/MM3 (ref 0–0.4)
EOSINOPHIL NFR BLD AUTO: 1.5 % (ref 0.3–6.2)
ERYTHROCYTE [DISTWIDTH] IN BLOOD BY AUTOMATED COUNT: 16.3 % (ref 12.3–15.4)
FERRITIN SERPL-MCNC: 24.2 NG/ML (ref 13–150)
HCT VFR BLD AUTO: 37.1 % (ref 34–46.6)
HGB BLD-MCNC: 11.8 G/DL (ref 12–15.9)
IMM GRANULOCYTES # BLD AUTO: 0.01 10*3/MM3 (ref 0–0.05)
IMM GRANULOCYTES NFR BLD AUTO: 0.2 % (ref 0–0.5)
IRON 24H UR-MRATE: 155 MCG/DL (ref 37–145)
IRON SATN MFR SERPL: 32 % (ref 20–50)
LYMPHOCYTES # BLD AUTO: 1.97 10*3/MM3 (ref 0.7–3.1)
LYMPHOCYTES NFR BLD AUTO: 37.3 % (ref 19.6–45.3)
MCH RBC QN AUTO: 25.1 PG (ref 26.6–33)
MCHC RBC AUTO-ENTMCNC: 31.8 G/DL (ref 31.5–35.7)
MCV RBC AUTO: 78.9 FL (ref 79–97)
MONOCYTES # BLD AUTO: 0.33 10*3/MM3 (ref 0.1–0.9)
MONOCYTES NFR BLD AUTO: 6.3 % (ref 5–12)
NEUTROPHILS NFR BLD AUTO: 2.87 10*3/MM3 (ref 1.7–7)
NEUTROPHILS NFR BLD AUTO: 54.3 % (ref 42.7–76)
NRBC BLD AUTO-RTO: 0 /100 WBC (ref 0–0.2)
PLATELET # BLD AUTO: 389 10*3/MM3 (ref 140–450)
PMV BLD AUTO: 10.6 FL (ref 6–12)
RBC # BLD AUTO: 4.7 10*6/MM3 (ref 3.77–5.28)
TIBC SERPL-MCNC: 480 MCG/DL (ref 298–536)
TRANSFERRIN SERPL-MCNC: 322 MG/DL (ref 200–360)
WBC NRBC COR # BLD AUTO: 5.28 10*3/MM3 (ref 3.4–10.8)

## 2024-12-11 PROCEDURE — 82728 ASSAY OF FERRITIN: CPT | Performed by: NURSE PRACTITIONER

## 2024-12-11 PROCEDURE — 84466 ASSAY OF TRANSFERRIN: CPT | Performed by: NURSE PRACTITIONER

## 2024-12-11 PROCEDURE — 83540 ASSAY OF IRON: CPT | Performed by: NURSE PRACTITIONER

## 2024-12-11 PROCEDURE — 85025 COMPLETE CBC W/AUTO DIFF WBC: CPT | Performed by: NURSE PRACTITIONER

## 2024-12-11 RX ORDER — CEPHALEXIN 500 MG/1
1 CAPSULE ORAL DAILY
COMMUNITY
Start: 2024-11-20

## 2024-12-11 RX ORDER — SEMAGLUTIDE 0.5 MG/.5ML
0.5 INJECTION, SOLUTION SUBCUTANEOUS WEEKLY
COMMUNITY
Start: 2024-11-12

## 2024-12-11 NOTE — ASSESSMENT & PLAN NOTE
Elevation today, patient has not yet had medication.  She should monitor blood pressure at home and notify clinic with consistent elevations or lows.  Will continue to monitor.

## 2024-12-11 NOTE — PROGRESS NOTES
"Chief Complaint  Hypertension, Hyperlipidemia, and Prediabetes (Iron level check)    Subjective      Leonarda Hogan is a 39 y.o. female who presents to [facility]     Patient in clinic for six week follow up on anemia. Had previously been experiencing increase in menstrual bleeding, hemoglobin 11.2 on most recent labs. Iron studies, B12 and folate were normal. Patient reports since her last visit her menstrual bleeding has been lighter, only heavy on the first 3 days.     HTN-  Managed with losartan and amlodipine. She has not been checking her blood pressure at home. Recently returned from traveling last night, admits her diet has been different. She has not yet had her medication today.     Patient has been off of Wegovy at least one month, recently refilled the prescription at the 0.5 mg dosage and plans to restart.     Objective   Vital Signs:   Vitals:    12/11/24 0752   BP: 144/84   BP Location: Left arm   Patient Position: Sitting   Cuff Size: Large Adult   Pulse: 80   Resp: 16   Temp: 97.6 °F (36.4 °C)   TempSrc: Temporal   SpO2: 100%   Weight: (!) 155 kg (341 lb 12.8 oz)   Height: 170.2 cm (67\")     Body mass index is 53.53 kg/m².    Wt Readings from Last 3 Encounters:   12/11/24 (!) 155 kg (341 lb 12.8 oz)   10/22/24 (!) 149 kg (328 lb 6.4 oz)   10/01/24 (!) 152 kg (334 lb)     BP Readings from Last 3 Encounters:   12/11/24 144/84   10/22/24 132/78   10/01/24 131/81       Health Maintenance   Topic Date Due    COVID-19 Vaccine (4 - 2024-25 season) 12/13/2024 (Originally 9/1/2024)    Pneumococcal Vaccine 0-64 (1 of 2 - PCV) 03/19/2025 (Originally 3/19/1991)    INFLUENZA VACCINE  03/31/2025 (Originally 7/1/2024)    TDAP/TD VACCINES (1 - Tdap) 10/22/2025 (Originally 3/19/2004)    ANNUAL PHYSICAL  10/22/2025    LIPID PANEL  10/22/2025    BMI FOLLOWUP  11/12/2025    PAP SMEAR  10/01/2027    HEPATITIS C SCREENING  Completed       Physical Exam  Constitutional:  "      Appearance: Normal appearance.   HENT:      Head: Normocephalic and atraumatic.      Nose: Nose normal.      Mouth/Throat:      Mouth: Mucous membranes are moist.      Pharynx: Oropharynx is clear.   Eyes:      Extraocular Movements: Extraocular movements intact.      Conjunctiva/sclera: Conjunctivae normal.      Pupils: Pupils are equal, round, and reactive to light.   Neck:      Thyroid: No thyroid mass, thyromegaly or thyroid tenderness.   Cardiovascular:      Rate and Rhythm: Normal rate and regular rhythm.      Heart sounds: Normal heart sounds.   Pulmonary:      Effort: Pulmonary effort is normal.      Breath sounds: Normal breath sounds.   Skin:     General: Skin is warm and dry.   Neurological:      General: No focal deficit present.      Mental Status: She is alert and oriented to person, place, and time.   Psychiatric:         Mood and Affect: Mood normal.         Behavior: Behavior normal.         Thought Content: Thought content normal.          Result Review :  The following data was reviewed by: PAM Sherwood on 12/11/2024:  Common labs          3/8/2024    09:02 10/22/2024    10:23   Common Labs   Glucose 84  79    BUN 12  9    Creatinine 0.96  0.79    Sodium 140  139    Potassium 4.4  4.2    Chloride 102  105    Calcium 9.5  9.4    Albumin 4.3  3.9    Total Bilirubin 0.9  0.8    Alkaline Phosphatase 58  69    AST (SGOT) 17  16    ALT (SGPT) 13  12    WBC  5.36    Hemoglobin  11.2    Hematocrit  36.4    Platelets  419    Total Cholesterol  139    Triglycerides  52    HDL Cholesterol  67    LDL Cholesterol   61    Hemoglobin A1C 5.60  5.70           Procedures          Assessment & Plan  Anemia, unspecified type  Repeat CBC and iron studies today.   Orders:    Ferritin    Iron Profile    CBC w AUTO Differential    Essential hypertension  Elevation today, patient has not yet had medication.  She should monitor blood pressure at home and notify clinic with consistent elevations or lows.  Will  continue to monitor.       Class 3 severe obesity without serious comorbidity with body mass index (BMI) of 50.0 to 59.9 in adult, unspecified obesity type  Patient to restart Wegovy.  Will continue to monitor.            FOLLOW UP  Return for Next scheduled follow up or sooner if concerns arise.  Patient was given instructions and counseling regarding her condition or for health maintenance advice. Please see specific information pulled into the AVS if appropriate.       Saira Sheryl, PAM  12/11/24  08:53 EST    CURRENT & DISCONTINUED MEDICATIONS  Current Outpatient Medications   Medication Instructions    albuterol sulfate  (90 Base) MCG/ACT inhaler 2 puffs, Inhalation, Every 4 Hours PRN    amLODIPine (NORVASC) 10 mg, Oral, Daily    atorvastatin (LIPITOR) 40 mg, Oral, Daily    cephalexin (KEFLEX) 500 MG capsule 1 capsule, Daily    cetirizine (ZYRTEC) 10 mg, Oral, Daily    EPINEPHrine (EPIPEN) 0.3 mg, Once    fluticasone (FLONASE) 50 MCG/ACT nasal spray 2 sprays, Nasal, Daily    hydroquinone 4 % cream 1 Application, 2 Times Daily    losartan (COZAAR) 100 mg, Oral, Daily    metFORMIN (GLUCOPHAGE) 500 mg, Oral, 2 Times Daily With Meals    montelukast (SINGULAIR) 10 mg, Oral, Nightly    spironolactone (ALDACTONE) 50 MG tablet Take 1 tablet by mouth once daily    vitamin D (ERGOCALCIFEROL) 50,000 Units, Oral, Weekly    Wegovy 0.5 MG/0.5ML solution auto-injector 0.5 mL, Weekly       Medications Discontinued During This Encounter   Medication Reason    Semaglutide-Weight Management (Wegovy) 2.4 MG/0.75ML solution auto-injector     Semaglutide-Weight Management (Wegovy) 2.4 MG/0.75ML solution auto-injector

## 2024-12-23 RX ORDER — LOSARTAN POTASSIUM 100 MG/1
100 TABLET ORAL DAILY
Qty: 90 TABLET | Refills: 0 | Status: SHIPPED | OUTPATIENT
Start: 2024-12-23

## 2025-01-13 RX ORDER — ATORVASTATIN CALCIUM 40 MG/1
40 TABLET, FILM COATED ORAL DAILY
Qty: 90 TABLET | Refills: 0 | Status: SHIPPED | OUTPATIENT
Start: 2025-01-13

## 2025-02-14 RX ORDER — ERGOCALCIFEROL 1.25 MG/1
50000 CAPSULE, LIQUID FILLED ORAL WEEKLY
Qty: 12 CAPSULE | Refills: 0 | Status: SHIPPED | OUTPATIENT
Start: 2025-02-14

## 2025-02-20 ENCOUNTER — OFFICE VISIT (OUTPATIENT)
Dept: SLEEP MEDICINE | Facility: HOSPITAL | Age: 40
End: 2025-02-20
Payer: COMMERCIAL

## 2025-02-20 VITALS
BODY MASS INDEX: 45.99 KG/M2 | SYSTOLIC BLOOD PRESSURE: 140 MMHG | OXYGEN SATURATION: 99 % | HEIGHT: 67 IN | HEART RATE: 74 BPM | DIASTOLIC BLOOD PRESSURE: 71 MMHG | WEIGHT: 293 LBS

## 2025-02-20 DIAGNOSIS — G47.33 OSA (OBSTRUCTIVE SLEEP APNEA): Primary | ICD-10-CM

## 2025-02-20 PROCEDURE — G0463 HOSPITAL OUTPT CLINIC VISIT: HCPCS

## 2025-02-20 NOTE — PROGRESS NOTES
"[facility] PULMONARY CARE         Dr Rubin Caicedo  [unfilled]  Patient Care Team:  Saira Saucedo APRN as PCP - General (Nurse Practitioner)    Chief Complaint:Apnea index 11.6 events per hour consistent with mild TYLER  Lowest oxygen saturation 74%  Apnea index in the supine positioning 12.8 events per hour  Apnea index on the right side 0 events per hour     Mean heart rate 67 bpm     Patient snored 95% of sleep time     Oxygen summary  Oxygen desaturation below 89% for 22 minutes    Interval History: Patient of annual compliance visit.  Currently on auto CPAP 5 to 15 cm.  Compliance 87% with average daily use 6 hours 54 minutes.  AHI and leak within normal limits.  She tells me that she has a lot of bloating and gas.  Currently goes to bed 8 gets up 445 gets about 7+ hours of sleep more rested with CPAP.  No tobacco alcohol caffeine abuse.  Currently has a nasal pillow that fits well.  Supplies been adequate.  [place] 3 out of 24 within normal limits.  Review of system positive abdominal bloating and morning headache.    REVIEW OF SYSTEMS:   CARDIOVASCULAR: No chest pain, chest pressure or chest discomfort. No palpitations or edema.   RESPIRATORY: No shortness of breath, cough or sputum.   GASTROINTESTINAL: No anorexia, nausea, vomiting or diarrhea. No abdominal pain or blood.   HEMATOLOGIC: No bleeding or bruising.     Ventilator/Non-Invasive Ventilation Settings (From admission, onward)      None              Vital Signs  Heart Rate:  [74] 74  BP: (140)/(71) 140/71  [unfilled]  Flowsheet Rows      Flowsheet Row First Filed Value   Admission Height 170.2 cm (67.01\") Documented at 02/20/2025 1100   Admission Weight 162 kg (356 lb 4.8 oz) Documented at 02/20/2025 1100                  Physical Exam:  Patient is examined using the personal protective equipment as per guidelines from infection control for this particular patient as enacted.  Hand hygiene was performed before and after patient " interaction.   General Appearance:    Alert, cooperative, in no acute distress.  Following simple commands  ENT Mallampati between 3 and 4 no nasal congestion  Neck midline trachea, no thyromegaly   Lungs:     Clear to auscultation, respirations regular, even and                  unlabored    Heart:    Regular rhythm and normal rate, normal S1 and S2, no            murmur, no gallop, no rub, no click   Chest Wall:    No abnormalities observed   Abdomen:     Normal bowel sounds, no masses, no organomegaly, soft        nontender, nondistended, no guarding, no rebound                tenderness   Extremities:   Moves all extremities well, no edema, no cyanosis, no             redness  CNS no focal neurological deficits normal sensory exam  Skin no rashes no nodules  Musculoskeletal no cyanosis no clubbing normal range of motion     Results Review:                                          I reviewed the patient's new clinical results.  I personally viewed and interpreted the patient's chest x-ray.        Medication Review:       No current facility-administered medications for this visit.      ASSESSMENT:   Mild debbie  Hiccups resolved  Asthma  Hypertension  Morbid obesity    PLAN:  Reviewed compliance download with the patient  Compliance AHI and leak look excellent  Due to patient's concerns of bloating I will lower her auto CPAP pressure to 5 to 10 cm.  Patient told to watch for symptoms.  If she starts snoring she needs to contact us sooner to increase the pressure.  Hiccups have improved  Treatment of asthma currently stable  Weight loss encouraged  Treatment underlying comorbidities  Follow-up in 1 year      Rubin Caicedo MD  02/20/25  13:00 EST

## 2025-03-03 ENCOUNTER — OFFICE VISIT (OUTPATIENT)
Dept: INTERNAL MEDICINE | Facility: CLINIC | Age: 40
End: 2025-03-03
Payer: COMMERCIAL

## 2025-03-03 VITALS
WEIGHT: 293 LBS | TEMPERATURE: 97.2 F | OXYGEN SATURATION: 97 % | HEART RATE: 92 BPM | RESPIRATION RATE: 16 BRPM | HEIGHT: 67 IN | BODY MASS INDEX: 45.99 KG/M2 | SYSTOLIC BLOOD PRESSURE: 122 MMHG | DIASTOLIC BLOOD PRESSURE: 68 MMHG

## 2025-03-03 DIAGNOSIS — R14.0 BLOATING: Primary | ICD-10-CM

## 2025-03-03 DIAGNOSIS — K21.9 GERD WITHOUT ESOPHAGITIS: ICD-10-CM

## 2025-03-03 DIAGNOSIS — R14.3 FLATULENCE: ICD-10-CM

## 2025-03-03 PROCEDURE — 99213 OFFICE O/P EST LOW 20 MIN: CPT | Performed by: PHYSICIAN ASSISTANT

## 2025-03-03 RX ORDER — PANTOPRAZOLE SODIUM 20 MG/1
20 TABLET, DELAYED RELEASE ORAL DAILY
Qty: 30 TABLET | Refills: 1 | Status: SHIPPED | OUTPATIENT
Start: 2025-03-03

## 2025-03-03 RX ORDER — SCOPOLAMINE 1 MG/3D
1 PATCH, EXTENDED RELEASE TRANSDERMAL
Qty: 4 EACH | Refills: 0 | Status: SHIPPED | OUTPATIENT
Start: 2025-03-03

## 2025-03-03 NOTE — PROGRESS NOTES
Chief Complaint  Gas    Subjective          Leonarda Hogan presents to [facility]  History of Present Illness  Pt here for eval of gas/bloating. Denies abdominal pain. Sx started when she was on Wegovy, she discontinued the medicine in Nov and sx still present.   She states as soon as she eats she has to go to the bathroom to have bm. Stool is normal. Denies diarrhea, blood in stool, nausea, vomiting.   Admits to heartburn. She has hiccups and belching.   She feels full quickly.   Pt eats 2 meals/day, does not eat much for breakfast.  Denies urinary issues.    Pt is going on a cruise. Would like medicine to help with sea sickness        Past Medical History:   Diagnosis Date    Abnormal Pap smear of cervix     Acne     Allergic     Asthma     Fibroid     HPV (human papilloma virus) infection     Hyperlipidemia     Hypertension     Kidney stones     Migraine     Sinus trouble         Past Surgical History:   Procedure Laterality Date    TONSILLECTOMY  2006    WISDOM TOOTH EXTRACTION  2007    WISDOM TOOTH EXTRACTION  2007        Current Outpatient Medications on File Prior to Visit   Medication Sig Dispense Refill    albuterol sulfate  (90 Base) MCG/ACT inhaler Inhale 2 puffs Every 4 (Four) Hours As Needed for Wheezing or Shortness of Air. 18 g 1    amLODIPine (NORVASC) 10 MG tablet Take 1 tablet by mouth Daily. 90 tablet 1    atorvastatin (LIPITOR) 40 MG tablet Take 1 tablet by mouth once daily 90 tablet 0    cephalexin (KEFLEX) 500 MG capsule Take 1 capsule by mouth Daily.      cetirizine (zyrTEC) 10 MG tablet Take 1 tablet by mouth Daily. 90 tablet 1    EPINEPHrine (EPIPEN) 0.3 MG/0.3ML solution auto-injector injection Inject 0.3 mL into the appropriate muscle as directed by prescriber 1 (One) Time.      fluticasone (FLONASE) 50 MCG/ACT nasal spray 2 sprays into the nostril(s) as directed by provider Daily. 16 g 1    hydroquinone 4 % cream Apply 1 Application  "topically to the appropriate area as directed 2 (Two) Times a Day.      losartan (COZAAR) 100 MG tablet Take 1 tablet by mouth once daily 90 tablet 0    montelukast (Singulair) 10 MG tablet Take 1 tablet by mouth Every Night. 90 tablet 1    spironolactone (ALDACTONE) 50 MG tablet Take 1 tablet by mouth once daily 30 tablet 0    vitamin D (ERGOCALCIFEROL) 1.25 MG (75716 UT) capsule capsule Take 1 capsule by mouth once a week 12 capsule 0    [DISCONTINUED] metFORMIN (GLUCOPHAGE) 500 MG tablet TAKE 1 TABLET BY MOUTH TWICE DAILY WITH MEALS (Patient not taking: Reported on 3/3/2025) 60 tablet 0    [DISCONTINUED] Wegovy 0.5 MG/0.5ML solution auto-injector Inject 0.5 mL under the skin into the appropriate area as directed 1 (One) Time Per Week. (Patient not taking: Reported on 3/3/2025)       No current facility-administered medications on file prior to visit.        Allergies   Allergen Reactions    Tilactase Other (See Comments)     Lactose intolerance, upset stomach and rashes      Cat Dander Unknown - Low Severity       Social History     Tobacco Use   Smoking Status Never   Smokeless Tobacco Never          Objective   Vital Signs:   /68 (BP Location: Left arm, Patient Position: Sitting, Cuff Size: Large Adult)   Pulse 92   Temp 97.2 °F (36.2 °C) (Temporal)   Resp 16   Ht 170.2 cm (67.01\")   Wt (!) 159 kg (350 lb)   SpO2 97%   BMI 54.80 kg/m²     Physical Exam  Vitals reviewed.   Constitutional:       Appearance: Normal appearance.   HENT:      Head: Normocephalic and atraumatic.      Nose: Nose normal.      Mouth/Throat:      Mouth: Mucous membranes are moist.   Eyes:      Extraocular Movements: Extraocular movements intact.      Conjunctiva/sclera: Conjunctivae normal.      Pupils: Pupils are equal, round, and reactive to light.   Cardiovascular:      Rate and Rhythm: Normal rate and regular rhythm.   Pulmonary:      Effort: Pulmonary effort is normal.      Breath sounds: Normal breath sounds. "   Abdominal:      General: Abdomen is flat. Bowel sounds are normal.      Palpations: Abdomen is soft.   Musculoskeletal:         General: Normal range of motion.   Neurological:      General: No focal deficit present.      Mental Status: She is alert and oriented to person, place, and time.   Psychiatric:         Mood and Affect: Mood normal.        Result Review :                    Assessment and Plan    Diagnoses and all orders for this visit:    1. Bloating (Primary)    2. Flatulence    3. GERD without esophagitis    Other orders  -     pantoprazole (Protonix) 20 MG EC tablet; Take 1 tablet by mouth Daily.  Dispense: 30 tablet; Refill: 1  -     Scopolamine 1 MG/3DAYS patch; Place 1 patch on the skin as directed by provider Every 72 (Seventy-Two) Hours.  Dispense: 4 each; Refill: 0    Discussed ddx, GERD symptoms, GERD diet, lifestyle changes (elevate head of bed, limit meals late at night, smaller more frequent meals). Encouraged food diary, avoid trigger foods. Will start PPI. Discussed hpyolri testing and other labs if no improvement or if sx change/worsen.        Follow Up   Return if symptoms worsen or fail to improve.  Patient was given instructions and counseling regarding her condition or for health maintenance advice. Please see specific information pulled into the AVS if appropriate.

## 2025-03-06 DIAGNOSIS — R14.0 BLOATING: ICD-10-CM

## 2025-03-06 DIAGNOSIS — R10.84 GENERALIZED ABDOMINAL PAIN: ICD-10-CM

## 2025-03-06 DIAGNOSIS — R14.0 BLOATING: Primary | ICD-10-CM

## 2025-03-07 LAB — H. PYLORI ANTIGEN STOOL: NEGATIVE

## 2025-03-07 PROCEDURE — 87338 HPYLORI STOOL AG IA: CPT | Performed by: PHYSICIAN ASSISTANT

## 2025-03-10 RX ORDER — LOSARTAN POTASSIUM 100 MG/1
100 TABLET ORAL DAILY
Qty: 90 TABLET | Refills: 1 | Status: SHIPPED | OUTPATIENT
Start: 2025-03-10

## 2025-03-12 ENCOUNTER — HOSPITAL ENCOUNTER (OUTPATIENT)
Dept: ULTRASOUND IMAGING | Facility: HOSPITAL | Age: 40
Discharge: HOME | End: 2025-03-12
Admitting: PHYSICIAN ASSISTANT
Payer: COMMERCIAL

## 2025-03-12 DIAGNOSIS — R10.84 GENERALIZED ABDOMINAL PAIN: ICD-10-CM

## 2025-03-12 DIAGNOSIS — R14.0 BLOATING: ICD-10-CM

## 2025-03-12 PROCEDURE — 76705 ECHO EXAM OF ABDOMEN: CPT

## 2025-03-24 ENCOUNTER — TELEPHONE (OUTPATIENT)
Dept: INTERNAL MEDICINE | Facility: CLINIC | Age: 40
End: 2025-03-24
Payer: COMMERCIAL

## 2025-03-24 NOTE — TELEPHONE ENCOUNTER
Caller: Leonarda Hogan    Relationship to patient: Self    Best call back number: [phone]    Chief complaint: GREEN AND YELLOW MUCUS, RUNNY NOSE AND CONGESTION.     Type of visit: OFFICE VISIT     Requested date: TODAY OR TOMORROW      If rescheduling, when is the original appointment:      Additional notes:IF YOU CAN'T GET HER IN PUT HER ON A WAIT LIST

## 2025-03-25 ENCOUNTER — HOSPITAL ENCOUNTER (OUTPATIENT)
Dept: MRI IMAGING | Facility: HOSPITAL | Age: 40
Discharge: HOME | End: 2025-03-25
Admitting: PHYSICIAN ASSISTANT
Payer: COMMERCIAL

## 2025-03-25 DIAGNOSIS — K86.9 PANCREATIC LESION: ICD-10-CM

## 2025-03-25 PROCEDURE — A9577 INJ MULTIHANCE: HCPCS | Performed by: PHYSICIAN ASSISTANT

## 2025-03-25 PROCEDURE — 25510000002 GADOBENATE DIMEGLUMINE 529 MG/ML SOLUTION: Performed by: PHYSICIAN ASSISTANT

## 2025-03-25 PROCEDURE — 74183 MRI ABD W/O CNTR FLWD CNTR: CPT

## 2025-03-25 RX ADMIN — GADOBENATE DIMEGLUMINE 20 ML: 529 INJECTION, SOLUTION INTRAVENOUS at 17:57

## 2025-03-27 ENCOUNTER — OFFICE VISIT (OUTPATIENT)
Dept: INTERNAL MEDICINE | Facility: CLINIC | Age: 40
End: 2025-03-27
Payer: COMMERCIAL

## 2025-03-27 VITALS
BODY MASS INDEX: 45.99 KG/M2 | DIASTOLIC BLOOD PRESSURE: 80 MMHG | HEART RATE: 75 BPM | SYSTOLIC BLOOD PRESSURE: 118 MMHG | WEIGHT: 293 LBS | TEMPERATURE: 97.9 F | HEIGHT: 67 IN | OXYGEN SATURATION: 100 % | RESPIRATION RATE: 16 BRPM

## 2025-03-27 DIAGNOSIS — B96.89 ACUTE BACTERIAL RHINOSINUSITIS: ICD-10-CM

## 2025-03-27 DIAGNOSIS — J30.9 ALLERGIC RHINITIS, UNSPECIFIED SEASONALITY, UNSPECIFIED TRIGGER: Primary | ICD-10-CM

## 2025-03-27 DIAGNOSIS — J01.90 ACUTE BACTERIAL RHINOSINUSITIS: ICD-10-CM

## 2025-03-27 PROCEDURE — 99213 OFFICE O/P EST LOW 20 MIN: CPT | Performed by: NURSE PRACTITIONER

## 2025-03-27 RX ORDER — BROMPHENIRAMINE MALEATE, PSEUDOEPHEDRINE HYDROCHLORIDE, AND DEXTROMETHORPHAN HYDROBROMIDE 2; 30; 10 MG/5ML; MG/5ML; MG/5ML
10 SYRUP ORAL 4 TIMES DAILY PRN
Qty: 240 ML | Refills: 0 | Status: SHIPPED | OUTPATIENT
Start: 2025-03-27

## 2025-03-27 RX ORDER — MONTELUKAST SODIUM 10 MG/1
10 TABLET ORAL NIGHTLY
Qty: 90 TABLET | Refills: 1 | Status: SHIPPED | OUTPATIENT
Start: 2025-03-27

## 2025-03-27 RX ORDER — CETIRIZINE HYDROCHLORIDE 10 MG/1
10 TABLET ORAL DAILY
Qty: 90 TABLET | Refills: 1 | Status: SHIPPED | OUTPATIENT
Start: 2025-03-27

## 2025-03-27 NOTE — PROGRESS NOTES
"Chief Complaint  Nasal Congestion (Patient stated that they went to  was treated for symptoms of flu. Patient tested negative for strep, flu, covid. ) and URI    Subjective        Leonarda Hogan presents to [facility] for concerns regarding cough, congestion.  Patient states a couple of weeks ago she was seen at urgent care after exposure to flu, she did take Tamiflu, got significantly better within a few days.  However, 7 to 10 days ago she started having cough, congestion, which is just worsened during that time.  She has not had any new fever or chills.  No body aches.  Does notice facial pressure and pain, primarily around the bridge of the nose.    Objective   Vital Signs:   /80 (BP Location: Left arm, Patient Position: Sitting, Cuff Size: Large Adult)   Pulse 75   Temp 97.9 °F (36.6 °C) (Temporal)   Resp 16   Ht 170.2 cm (67.01\")   Wt (!) 160 kg (353 lb 12.8 oz)   SpO2 100%   BMI 55.40 kg/m²     Physical Exam  Vitals and nursing note reviewed.   Constitutional:       Appearance: Normal appearance.   HENT:      Head: Normocephalic and atraumatic.      Right Ear: Tympanic membrane, ear canal and external ear normal.      Left Ear: Tympanic membrane, ear canal and external ear normal.      Nose: Congestion and rhinorrhea present.      Mouth/Throat:      Mouth: Mucous membranes are moist.      Pharynx: Oropharynx is clear.   Eyes:      Conjunctiva/sclera: Conjunctivae normal.      Pupils: Pupils are equal, round, and reactive to light.   Cardiovascular:      Rate and Rhythm: Normal rate and regular rhythm.   Pulmonary:      Effort: Pulmonary effort is normal.      Breath sounds: Normal breath sounds.   Neurological:      Mental Status: She is alert.   Psychiatric:         Mood and Affect: Mood normal.        Result Review :  {The following data was reviewed by PAM Mtz on 03/27/25                Diagnoses and all orders for this visit:    1. Allergic rhinitis, " unspecified seasonality, unspecified trigger (Primary)  -     cetirizine (zyrTEC) 10 MG tablet; Take 1 tablet by mouth Daily.  Dispense: 90 tablet; Refill: 1  -     montelukast (Singulair) 10 MG tablet; Take 1 tablet by mouth Every Night.  Dispense: 90 tablet; Refill: 1    2. Acute bacterial rhinosinusitis  -     amoxicillin-clavulanate (AUGMENTIN) 875-125 MG per tablet; Take 1 tablet by mouth 2 (Two) Times a Day for 10 days.  Dispense: 20 tablet; Refill: 0  -     brompheniramine-pseudoephedrine-DM 30-2-10 MG/5ML syrup; Take 10 mL by mouth 4 (Four) Times a Day As Needed for Congestion or Cough.  Dispense: 240 mL; Refill: 0    Patient's symptoms most consistent with acute bacterial rhinosinusitis based on severity and length of symptoms.  She did have a preceding viral illness, which may have weakened her defenses.  I recommend that she take Augmentin, Bromfed for her current symptoms.  I did refill her medications, cetirizine and Singulair for her chronic allergic rhinitis.  Encouraged adequate hydration, rest, and can follow-up as needed.      Follow Up   No follow-ups on file.  Patient was given instructions and counseling regarding her condition or for health maintenance advice. Please see specific information pulled into the AVS if appropriate.     David Whalen, PAM  3/27/2025  This note was electronically signed.

## 2025-04-09 ENCOUNTER — TELEPHONE (OUTPATIENT)
Dept: INTERNAL MEDICINE | Facility: CLINIC | Age: 40
End: 2025-04-09
Payer: COMMERCIAL

## 2025-04-09 DIAGNOSIS — Z12.31 ENCOUNTER FOR SCREENING MAMMOGRAM FOR BREAST CANCER: Primary | ICD-10-CM

## 2025-04-09 DIAGNOSIS — Z12.31 SCREENING MAMMOGRAM FOR BREAST CANCER: ICD-10-CM

## 2025-04-09 NOTE — TELEPHONE ENCOUNTER
Called and spoke with pt, explained to pt mammogram has been ordered, explained to pt the scheduling department should reach out to pt to schedule apt for mammogram, pt verbalized understanding and had no further questions at this time.

## 2025-04-09 NOTE — TELEPHONE ENCOUNTER
Caller: Leonarda Hogan    Relationship: Self    Best call back number: [phone]     What orders are you requesting (i.e. lab or imaging): ROUTINE MAMMOGRAM     In what timeframe would the patient need to come in: AS SOON AS POSSIBLE     Where will you receive your lab/imaging services: [facility]

## 2025-04-25 ENCOUNTER — OFFICE VISIT (OUTPATIENT)
Dept: INTERNAL MEDICINE | Facility: CLINIC | Age: 40
End: 2025-04-25
Payer: COMMERCIAL

## 2025-04-25 VITALS
TEMPERATURE: 97.7 F | RESPIRATION RATE: 18 BRPM | HEIGHT: 67 IN | BODY MASS INDEX: 45.99 KG/M2 | HEART RATE: 70 BPM | WEIGHT: 293 LBS | SYSTOLIC BLOOD PRESSURE: 128 MMHG | OXYGEN SATURATION: 98 % | DIASTOLIC BLOOD PRESSURE: 80 MMHG

## 2025-04-25 DIAGNOSIS — R73.03 PREDIABETES: ICD-10-CM

## 2025-04-25 DIAGNOSIS — J30.9 ALLERGIC RHINITIS, UNSPECIFIED SEASONALITY, UNSPECIFIED TRIGGER: Primary | ICD-10-CM

## 2025-04-25 DIAGNOSIS — I10 ESSENTIAL HYPERTENSION: ICD-10-CM

## 2025-04-25 LAB — HBA1C MFR BLD: 7 % (ref 4.8–5.6)

## 2025-04-25 PROCEDURE — 82150 ASSAY OF AMYLASE: CPT | Performed by: PHYSICIAN ASSISTANT

## 2025-04-25 PROCEDURE — 80050 GENERAL HEALTH PANEL: CPT | Performed by: PHYSICIAN ASSISTANT

## 2025-04-25 PROCEDURE — 83690 ASSAY OF LIPASE: CPT | Performed by: PHYSICIAN ASSISTANT

## 2025-04-25 PROCEDURE — 83036 HEMOGLOBIN GLYCOSYLATED A1C: CPT | Performed by: PHYSICIAN ASSISTANT

## 2025-04-25 PROCEDURE — 80061 LIPID PANEL: CPT | Performed by: PHYSICIAN ASSISTANT

## 2025-04-25 NOTE — ASSESSMENT & PLAN NOTE
Hypertension is stable and controlled  Continue current treatment regimen.  Blood pressure will be reassessed in 3 months  Labs today.

## 2025-04-25 NOTE — PROGRESS NOTES
Chief Complaint  Hypertension, Hyperlipidemia, and Diabetes    Subjective          Leonarda Hogan presents to [facility]  History of Present Illness    HTN:  Denies chest pain, heart raciness, dizziness.  Had SoB with recent sinus infection, SoB is improving.  Does not check BP at home.    Allergies:  Nasal congestion, itchy eyes and ears, green/yellow postnasal drainage persistent but improving.  Chest congestion, cough have improved.    DM:  Last A1C 1 year ago  Denies dizziness, lightheadedness    Abdominal bloating:  Not taking Pantoprazole.  Pressure was adjusted on CPAP, symptoms have improved.    Past Medical History:   Diagnosis Date    Abnormal Pap smear of cervix     Acne     Allergic     Asthma     Fibroid     HPV (human papilloma virus) infection     Hyperlipidemia     Hypertension     Kidney stones     Migraine     Sinus trouble         Past Surgical History:   Procedure Laterality Date    TONSILLECTOMY  2006    WISDOM TOOTH EXTRACTION  2007    WISDOM TOOTH EXTRACTION  2007        Current Outpatient Medications on File Prior to Visit   Medication Sig Dispense Refill    albuterol sulfate  (90 Base) MCG/ACT inhaler Inhale 2 puffs Every 4 (Four) Hours As Needed for Wheezing or Shortness of Air. 18 g 1    amLODIPine (NORVASC) 10 MG tablet Take 1 tablet by mouth Daily. 90 tablet 1    atorvastatin (LIPITOR) 40 MG tablet Take 1 tablet by mouth once daily 90 tablet 0    cetirizine (zyrTEC) 10 MG tablet Take 1 tablet by mouth Daily. 90 tablet 1    EPINEPHrine (EPIPEN) 0.3 MG/0.3ML solution auto-injector injection Inject 0.3 mL into the appropriate muscle as directed by prescriber 1 (One) Time.      fluticasone (FLONASE) 50 MCG/ACT nasal spray 2 sprays into the nostril(s) as directed by provider Daily. 16 g 1    losartan (COZAAR) 100 MG tablet Take 1 tablet by mouth once daily 90 tablet 1    montelukast (Singulair) 10 MG tablet Take 1 tablet by mouth Every  "Night. 90 tablet 1    Scopolamine 1 MG/3DAYS patch Place 1 patch on the skin as directed by provider Every 72 (Seventy-Two) Hours. 4 each 0    spironolactone (ALDACTONE) 50 MG tablet Take 1 tablet by mouth once daily 30 tablet 0    vitamin D (ERGOCALCIFEROL) 1.25 MG (36842 UT) capsule capsule Take 1 capsule by mouth once a week 12 capsule 0    [DISCONTINUED] brompheniramine-pseudoephedrine-DM 30-2-10 MG/5ML syrup Take 10 mL by mouth 4 (Four) Times a Day As Needed for Congestion or Cough. 240 mL 0    [DISCONTINUED] hydroquinone 4 % cream Apply 1 Application topically to the appropriate area as directed 2 (Two) Times a Day.      [DISCONTINUED] pantoprazole (Protonix) 20 MG EC tablet Take 1 tablet by mouth Daily. 30 tablet 1     No current facility-administered medications on file prior to visit.        Allergies   Allergen Reactions    Tilactase Other (See Comments)     Lactose intolerance, upset stomach and rashes      Cat Dander Unknown - Low Severity       Social History     Tobacco Use   Smoking Status Never   Smokeless Tobacco Never          Objective   Vital Signs:   /80 (BP Location: Left arm, Patient Position: Sitting, Cuff Size: Large Adult)   Pulse 70   Temp 97.7 °F (36.5 °C) (Temporal)   Resp 18   Ht 170.2 cm (67.01\")   Wt (!) 164 kg (361 lb)   SpO2 98%   BMI 56.52 kg/m²     Physical Exam  Vitals reviewed.   Constitutional:       Appearance: Normal appearance.   HENT:      Head: Normocephalic and atraumatic.      Nose: Nose normal.      Mouth/Throat:      Mouth: Mucous membranes are moist.   Eyes:      Extraocular Movements: Extraocular movements intact.      Conjunctiva/sclera: Conjunctivae normal.      Pupils: Pupils are equal, round, and reactive to light.   Cardiovascular:      Rate and Rhythm: Normal rate and regular rhythm.   Pulmonary:      Effort: Pulmonary effort is normal.      Breath sounds: Normal breath sounds.   Abdominal:      General: Abdomen is flat. Bowel sounds are normal.    "   Palpations: Abdomen is soft.   Musculoskeletal:         General: Normal range of motion.   Neurological:      General: No focal deficit present.      Mental Status: She is alert and oriented to person, place, and time.   Psychiatric:         Mood and Affect: Mood normal.        Result Review :            Class 3 Severe Obesity (BMI >=40). Obesity-related health conditions include the following: hypertension and diabetes mellitus. Obesity is unchanged. BMI is is above average; BMI management plan is completed. We discussed portion control and increasing exercise.              Assessment and Plan    Diagnoses and all orders for this visit:    1. Allergic rhinitis, unspecified seasonality, unspecified trigger (Primary)  Assessment & Plan:  Persistent allergy symptoms could be related to seasonal changes. Because symptoms are improving, continue Flonase, Singulair. May change Zyrtec for Claritin or Allegra, as this change can help allergy symptoms. May add Benadryl before bed to improve allergy symptoms. Labs today to evaluate for infection. If symptoms worsen, call the office.       2. Prediabetes  Comments:  Labs today to eval.  Orders:  -     Hemoglobin A1c  -     Cancel: Microalbumin / Creatinine Urine Ratio - Urine, Clean Catch    3. Essential hypertension  Assessment & Plan:  Hypertension is stable and controlled  Continue current treatment regimen.  Blood pressure will be reassessed in 3 months  Labs today.          Follow Up   Return in about 3 months (around 7/25/2025), or if symptoms worsen or fail to improve.  Patient was given instructions and counseling regarding her condition or for health maintenance advice. Please see specific information pulled into the AVS if appropriate.     Patient has been erroneously marked as diabetic. Based on the available clinical information, she does not have diabetes and should therefore be excluded from diabetic health maintenance and quality measures for the remainder of  the reporting period.

## 2025-04-25 NOTE — ASSESSMENT & PLAN NOTE
Persistent allergy symptoms could be related to seasonal changes. Because symptoms are improving, continue Flonase, Singulair. May change Zyrtec for Claritin or Allegra, as this change can help allergy symptoms. May add Benadryl before bed to improve allergy symptoms. Labs today to evaluate for infection. If symptoms worsen, call the office.

## 2025-05-02 ENCOUNTER — HOSPITAL ENCOUNTER (OUTPATIENT)
Dept: MAMMOGRAPHY | Facility: HOSPITAL | Age: 40
Discharge: HOME | End: 2025-05-02
Admitting: PHYSICIAN ASSISTANT
Payer: COMMERCIAL

## 2025-05-02 DIAGNOSIS — Z12.31 SCREENING MAMMOGRAM FOR BREAST CANCER: ICD-10-CM

## 2025-05-02 PROCEDURE — 77063 BREAST TOMOSYNTHESIS BI: CPT

## 2025-05-02 PROCEDURE — 77067 SCR MAMMO BI INCL CAD: CPT

## 2025-05-12 RX ORDER — ERGOCALCIFEROL 1.25 MG/1
50000 CAPSULE, LIQUID FILLED ORAL WEEKLY
Qty: 12 CAPSULE | Refills: 0 | Status: SHIPPED | OUTPATIENT
Start: 2025-05-12

## 2025-05-27 ENCOUNTER — OFFICE VISIT (OUTPATIENT)
Dept: GASTROENTEROLOGY | Facility: CLINIC | Age: 40
End: 2025-05-27
Payer: COMMERCIAL

## 2025-05-27 VITALS
HEIGHT: 67 IN | BODY MASS INDEX: 45.99 KG/M2 | RESPIRATION RATE: 18 BRPM | SYSTOLIC BLOOD PRESSURE: 143 MMHG | HEART RATE: 72 BPM | DIASTOLIC BLOOD PRESSURE: 77 MMHG | OXYGEN SATURATION: 97 % | WEIGHT: 293 LBS

## 2025-05-27 DIAGNOSIS — K76.0 HEPATIC STEATOSIS: Primary | ICD-10-CM

## 2025-05-27 PROCEDURE — 99214 OFFICE O/P EST MOD 30 MIN: CPT

## 2025-05-27 NOTE — PROGRESS NOTES
Chief Complaint     Fatty Liver    Patient or patient representative verbalized consent for the use of Ambient Listening during the visit with  PAM Stockton for chart documentation. 5/28/2025  09:01 EDT      History of Present Illness     Leonarda Hogan is a 40 y.o. female who presents to [facility] on referral from Ludy Leblanc PA-C for a gastroenterology evaluation of fatty liver.      History of Present Illness  The patient is a 40-year-old female who presents for evaluation of fatty liver.    She underwent an MRI on 03/25/2025, following an ultrasound of the gallbladder on 03/12/2025, which revealed an indeterminate hypoechoic lesion within the region of the head of the pancreas and fatty liver. She reports no right upper quadrant pain. Her alcohol consumption is minimal, with only 1 to 2 instances per year. She has no history of unprofessional tattoos, exposure to hepatitis, blood transfusions, or intravenous drug use. There is no family history of liver disease. She does not use herbal supplements. She has not had any recent changes in her medication regimen.     She was previously on Wegovy but discontinued its use due to gastrointestinal issues around 10/2024 or 11/2024. Despite engaging in physical exercise, she reports weight gain. She does not experience heartburn, nausea, or vomiting but does report frequent hiccups, which she attributes to her CPAP use. She has not undergone an EGD or colonoscopy. She took Tylenol a few days ago. She reports no presence of blood in her stool or black, tarry stool. A stool sample was collected in 03/2025.    She has been experiencing excessive gas and bloating for several months, which prompted the ultrasound. She also has sleep apnea and uses a CPAP machine, which may be contributing to air entering her stomach.    She was taking iron supplements in 12/2024 due to low iron levels.    SOCIAL HISTORY  Exercise: The  patient reports working out but is still gaining weight.  Alcohol: The patient drinks alcohol maybe once or twice a year.  Coffee/Tea/Caffeine-containing Drinks: The patient reports not being a coffee person.    FAMILY HISTORY  - Negative for liver disease  - Negative for colon cancer  - Negative for colon polyps    3/25/2025 MRI abdomen with and without contrast - enlarged inna hepatic lymph node commonly reactive in the setting of hepatic steatosis.  Hepatic steatosis likely moderate versus severe.  No acute MRI findings.    3/12/2025 Ultrasound gallbladder - indeterminant hypoechoic lesion within the region of the head of the pancreas.  Hepatic steatosis.       History      Past Medical History:   Diagnosis Date    Abnormal Pap smear of cervix     Acne     Allergic     Asthma     Fibroid     HPV (human papilloma virus) infection     Hyperlipidemia     Hypertension     Kidney stones     Migraine     Sinus trouble        Past Surgical History:   Procedure Laterality Date    TONSILLECTOMY  2006    WISDOM TOOTH EXTRACTION  2007    WISDOM TOOTH EXTRACTION  2007       Family History   Problem Relation Age of Onset    Heart disease Father     Sleep apnea Father     Diabetes Sister     Sleep apnea Sister     Breast cancer Sister     Heart disease Paternal Grandmother     Ovarian cancer Neg Hx     Uterine cancer Neg Hx     Colon cancer Neg Hx     Melanoma Neg Hx     Prostate cancer Neg Hx     Deep vein thrombosis Neg Hx         Current Medications        Current Outpatient Medications:     albuterol sulfate  (90 Base) MCG/ACT inhaler, Inhale 2 puffs Every 4 (Four) Hours As Needed for Wheezing or Shortness of Air., Disp: 18 g, Rfl: 1    amLODIPine (NORVASC) 10 MG tablet, Take 1 tablet by mouth Daily., Disp: 90 tablet, Rfl: 1    atorvastatin (LIPITOR) 40 MG tablet, Take 1 tablet by mouth once daily, Disp: 90 tablet, Rfl: 0    cetirizine (zyrTEC) 10 MG tablet, Take 1 tablet by mouth Daily., Disp: 90 tablet, Rfl: 1     "fluticasone (FLONASE) 50 MCG/ACT nasal spray, 2 sprays into the nostril(s) as directed by provider Daily., Disp: 16 g, Rfl: 1    losartan (COZAAR) 100 MG tablet, Take 1 tablet by mouth once daily, Disp: 90 tablet, Rfl: 1    montelukast (Singulair) 10 MG tablet, Take 1 tablet by mouth Every Night., Disp: 90 tablet, Rfl: 1    spironolactone (ALDACTONE) 50 MG tablet, Take 1 tablet by mouth once daily, Disp: 30 tablet, Rfl: 0    vitamin D (ERGOCALCIFEROL) 1.25 MG (66392 UT) capsule capsule, Take 1 capsule by mouth once a week, Disp: 12 capsule, Rfl: 0    EPINEPHrine (EPIPEN) 0.3 MG/0.3ML solution auto-injector injection, Inject 0.3 mL into the appropriate muscle as directed by prescriber 1 (One) Time. (Patient not taking: Reported on 5/27/2025), Disp: , Rfl:     Scopolamine 1 MG/3DAYS patch, Place 1 patch on the skin as directed by provider Every 72 (Seventy-Two) Hours. (Patient not taking: Reported on 5/27/2025), Disp: 4 each, Rfl: 0     Allergies     Allergies   Allergen Reactions    Tilactase Other (See Comments)     Lactose intolerance, upset stomach and rashes      Cat Dander Unknown - Low Severity       Social History       Social History     Social History Narrative    Not on file       Immunizations     Immunization:  Immunization History   Administered Date(s) Administered    COVID-19 (MODERNA) 1st,2nd,3rd Dose Monovalent 07/08/2021, 08/05/2021    COVID-19 (MODERNA) Monovalent Original Booster 02/02/2022          Objective     Objective     Vital Signs:   /77 (BP Location: Left arm, Patient Position: Sitting, Cuff Size: Large Adult)   Pulse 72   Resp 18   Ht 170.2 cm (67.01\")   Wt (!) 166 kg (365 lb)   SpO2 97%   BMI 57.15 kg/m²       Physical Exam  HENT:      Head: Normocephalic.   Cardiovascular:      Rate and Rhythm: Normal rate.   Pulmonary:      Effort: Pulmonary effort is normal.   Abdominal:      Palpations: Abdomen is soft.   Musculoskeletal:         General: Normal range of motion. " "  Neurological:      General: No focal deficit present.      Mental Status: She is alert.   Psychiatric:         Mood and Affect: Mood normal.                 Results      Result Review :   The following data was reviewed by: PAM Stockton on 05/27/2025:    Lab Results - Last 18 Months   Lab Units 04/25/25  0842 12/11/24  0825 10/22/24  1023   WBC 10*3/mm3 5.02 5.28 5.36   HEMOGLOBIN g/dL 12.0 11.8* 11.2*   MCV fL 80.4 78.9* 77.8*   PLATELETS 10*3/mm3 357 389 419         Lab Results - Last 18 Months   Lab Units 04/25/25  0842 10/22/24  1023 03/08/24  0902   BUN mg/dL 8 9 12   CREATININE mg/dL 0.54* 0.79 0.96   SODIUM mmol/L 139 139 140   POTASSIUM mmol/L 4.5 4.2 4.4   CHLORIDE mmol/L 102 105 102   CO2 mmol/L 26.0 25.8 24.2   GLUCOSE mg/dL 86 79 84      No results for input(s): \"PROTIME\", \"INR\", \"PTT\" in the last 76604 hours.  Lab Results - Last 18 Months   Lab Units 04/25/25  0842 10/22/24  1023 03/08/24  0902   AST (SGOT) U/L 20 16 17   ALT (SGPT) U/L 19 12 13   ALK PHOS U/L 73 69 58   BILIRUBIN mg/dL 0.6 0.8 0.9   TOTAL PROTEIN g/dL 7.6 7.4 7.5   ALBUMIN g/dL 4.0 3.9 4.3      Lab Results - Last 18 Months   Lab Units 12/11/24  0825 10/22/24  1023   IRON mcg/dL 155* 51   TRANSFERRIN mg/dL 322 321   TIBC mcg/dL 480 478   FERRITIN ng/mL 24.20 20.50   VITAMIN B 12 pg/mL  --  473   FOLATE ng/mL  --  5.51     No results for input(s): \"HAV\", \"HEPAIGM\", \"HEPBIGM\", \"HEPBCAB\", \"HBEAG\", \"HEPCAB\" in the last 14713 hours.    Mammo Screening Digital Tomosynthesis Bilateral With CAD  Result Date: 5/2/2025  No mammographic evidence of malignancy.  Recommend annual screening mammography.  BI-RADS ASSESSMENT: BI-RADS 1. Negative.  Note:  It has been reported that there is approximately a 15% false negative rate in mammography.  Therefore, management of a palpable abnormality should not be deferred because of a negative mammogram.  5/2/2025 9:00 AM by ANASTASIYA NARANJO MD on Workstation: [id number]      MRI Abdomen With " & Without Contrast  Result Date: 3/25/2025  Impression: 1. Motion-degraded exam. 2. Normal MRI appearance of the pancreas without mass. Measured lesion on ultrasound corresponds to an enlarged inna hepatic lymph node commonly reactive in the setting of hepatic steatosis. 3. Hepatic steatosis likely moderate versus severe. 4. No acute MRI findings. Electronically Signed: Tr Garrison MD  3/25/2025 6:27 PM EDT  Workstation ID: [id number]    US Gallbladder  Result Date: 3/14/2025  1. Indeterminate hypoechoic lesion within the region of the head of the pancreas. Further evaluation with MRI of the abdomen with and without contrast recommended to further evaluate 2. Hepatic steatosis. Please correlate with liver function tests Electronically Signed: Ramirez Zurita MD  3/14/2025 9:47 AM EDT  Workstation ID: [id number]      Results  Labs   - Liver enzymes including AST, ALT, alkaline phosphatase, and bilirubin: Normal    Imaging   - Ultrasound of the gallbladder: 03/12/2025, Indeterminate hypoechoic lesion within the region of the head of the pancreas and fatty liver   - MRI of the abdomen with and without contrast: 03/25/2025, Enlarged inna hepatic lymph node commonly reactive in the setting of hepatic steatosis, possible fatty liver, hepatic steatosis likely moderate versus severe, no acute MRI findings             Assessment and Plan        Assessment and Plan    Diagnoses and all orders for this visit:    1. Hepatic steatosis (Primary)    2. BMI 50.0-59.9, adult          Assessment & Plan  1. Hepatic steatosis:  - MRI findings from 03/25/2025 indicate likely moderate hepatic steatosis.  - Liver enzymes (AST, ALT, alkaline phosphatase, bilirubin) are within normal limits.  - Detailed explanation of hepatic steatosis, its progression to fibrosis and cirrhosis, and potential causes (diet, alcohol use, autoimmune diseases, genetics, hepatitis).  - Emphasis on dietary modifications: reduce carbohydrate intake (breads,  pasta, rice), sugars, and greasy foods.  - Encourage physical activity to achieve a weight loss goal of 10% over the next year.  - Advise consumption of 2 to 3 cups of black coffee daily to reduce the risk of liver cancer by 43%.  - Provide information about fatty liver.  - Order liver elastography (FibroScan) to assess the extent of fibrosis.  - Conduct laboratory tests to exclude other potential causes of fatty liver.  - Schedule ultrasound of the liver and additional laboratory tests in 6 months.    2. Gas and bloating:  - Reported gas and bloating for a couple of months, possibly related to CPAP use causing air to enter the stomach.  - Symptom not typically associated with fatty liver.    3. Iron deficiency:  - Mentioned taking iron supplements in 12/2024 due to low iron levels.    Follow-up: Ultrasound of the liver and laboratory tests in 6 months.      * Surgery not found *      Follow Up        Follow Up   Return in about 6 months (around 11/27/2025) for Fatty Liver Disease.    Hepatic lab workup ordered to determine cause of fatty liver. Differential diagnosis include but are not limited to viral hepatitis, autoimmune hepatitis, NAFLD, toxic hepatitis, vascular causes such as Budd Chiari or CHF, or hereditary causes such as hemochromatosis, alpha 1 antitrypsin deficiency, or Phong disease. Follow up Liver US and Labs in 6 months. Will order FibroScan  as noninvasive testing for fibrosis and fatty liver.     Advised patient to maintain a healthy lifestyle: weight loss of 10%, cutting back on carbs, sugars, and fried fatty foods, limiting intake of soda and sugary drinks, and abstain from alcohol. Recommend 2 to 3 cups of black coffee a day. Advise patient to go on daily walks with 10,000 steps daily (as recommended for patients with NAFLD/NICHOLSON).     Patient was given instructions and counseling regarding her condition or for health maintenance advice. Please see specific information pulled into the AVS if  appropriate.

## 2025-05-27 NOTE — PATIENT INSTRUCTIONS
Advised patient to maintain a healthy lifestyle: weight loss of 10%, cutting back on carbs, sugars, and fried fatty foods, limiting intake of soda and sugary drinks, and abstain from alcohol. Recommend 2 to 3 cups of black coffee a day. Advise patient to go on daily walks with 10,000 steps daily (as recommended for patients with NAFLD/NICHOLSON).

## 2025-06-18 ENCOUNTER — TELEPHONE (OUTPATIENT)
Dept: GASTROENTEROLOGY | Facility: CLINIC | Age: 40
End: 2025-06-18
Payer: COMMERCIAL

## 2025-06-18 NOTE — TELEPHONE ENCOUNTER
Attempted to call patient and left a message about currently scheduled Fibroscan on 08/01/2025. There is a sooner appointment for this Friday 06/20/2025 we just wanted to see if you would like to complete this test sooner. Please call the office back if you would like to move this appointment.

## 2025-07-14 RX ORDER — AMLODIPINE BESYLATE 10 MG/1
10 TABLET ORAL DAILY
Qty: 90 TABLET | Refills: 0 | Status: SHIPPED | OUTPATIENT
Start: 2025-07-14

## 2025-07-29 ENCOUNTER — TELEPHONE (OUTPATIENT)
Dept: GASTROENTEROLOGY | Facility: HOSPITAL | Age: 40
End: 2025-07-29
Payer: COMMERCIAL

## 2025-07-31 ENCOUNTER — TELEPHONE (OUTPATIENT)
Dept: GASTROENTEROLOGY | Facility: CLINIC | Age: 40
End: 2025-07-31
Payer: COMMERCIAL

## 2025-07-31 ENCOUNTER — TELEPHONE (OUTPATIENT)
Dept: GASTROENTEROLOGY | Facility: HOSPITAL | Age: 40
End: 2025-07-31
Payer: COMMERCIAL

## 2025-07-31 DIAGNOSIS — K76.0 HEPATIC STEATOSIS: Primary | ICD-10-CM

## 2025-07-31 NOTE — TELEPHONE ENCOUNTER
This patient is on the scheduled for a Fibroscan for 08/01/2025. There is not an order for this testing, I will need an order placed before testing can be completed.

## 2025-08-01 ENCOUNTER — PROCEDURE VISIT (OUTPATIENT)
Dept: OTHER | Facility: HOSPITAL | Age: 40
End: 2025-08-01
Payer: COMMERCIAL

## 2025-08-01 DIAGNOSIS — K76.0 HEPATIC STEATOSIS: ICD-10-CM

## 2025-08-01 PROCEDURE — 91200 LIVER ELASTOGRAPHY: CPT | Performed by: NURSE PRACTITIONER

## 2025-08-05 ENCOUNTER — RESULTS FOLLOW-UP (OUTPATIENT)
Dept: GASTROENTEROLOGY | Facility: CLINIC | Age: 40
End: 2025-08-05
Payer: COMMERCIAL

## 2025-08-05 RX ORDER — ERGOCALCIFEROL 1.25 MG/1
50000 CAPSULE, LIQUID FILLED ORAL WEEKLY
Qty: 12 CAPSULE | Refills: 0 | Status: SHIPPED | OUTPATIENT
Start: 2025-08-05

## 2025-08-07 ENCOUNTER — TELEPHONE (OUTPATIENT)
Age: 40
End: 2025-08-07
Payer: COMMERCIAL

## 2025-08-07 DIAGNOSIS — K76.0 HEPATIC STEATOSIS: Primary | ICD-10-CM
